# Patient Record
Sex: FEMALE | Race: WHITE | NOT HISPANIC OR LATINO | Employment: FULL TIME | URBAN - METROPOLITAN AREA
[De-identification: names, ages, dates, MRNs, and addresses within clinical notes are randomized per-mention and may not be internally consistent; named-entity substitution may affect disease eponyms.]

---

## 2017-01-03 ENCOUNTER — GENERIC CONVERSION - ENCOUNTER (OUTPATIENT)
Dept: OTHER | Facility: OTHER | Age: 52
End: 2017-01-03

## 2017-01-25 ENCOUNTER — GENERIC CONVERSION - ENCOUNTER (OUTPATIENT)
Dept: OTHER | Facility: OTHER | Age: 52
End: 2017-01-25

## 2017-05-03 ENCOUNTER — ALLSCRIPTS OFFICE VISIT (OUTPATIENT)
Dept: OTHER | Facility: OTHER | Age: 52
End: 2017-05-03

## 2017-07-07 RX ORDER — NIFEDIPINE 30 MG/1
30 TABLET, FILM COATED, EXTENDED RELEASE ORAL EVERY MORNING
COMMUNITY
End: 2018-02-01 | Stop reason: SDUPTHER

## 2017-07-07 RX ORDER — DIPHENOXYLATE HYDROCHLORIDE AND ATROPINE SULFATE 2.5; .025 MG/1; MG/1
1 TABLET ORAL EVERY MORNING
COMMUNITY

## 2017-07-07 RX ORDER — VITAMIN E 268 MG
400 CAPSULE ORAL EVERY MORNING
COMMUNITY

## 2017-07-10 ENCOUNTER — GENERIC CONVERSION - ENCOUNTER (OUTPATIENT)
Dept: OTHER | Facility: OTHER | Age: 52
End: 2017-07-10

## 2017-07-10 ENCOUNTER — HOSPITAL ENCOUNTER (OUTPATIENT)
Facility: AMBULARY SURGERY CENTER | Age: 52
Setting detail: OUTPATIENT SURGERY
Discharge: HOME | End: 2017-07-10
Attending: INTERNAL MEDICINE | Admitting: INTERNAL MEDICINE
Payer: COMMERCIAL

## 2017-07-10 ENCOUNTER — ANESTHESIA (OUTPATIENT)
Dept: GASTROENTEROLOGY | Facility: AMBULARY SURGERY CENTER | Age: 52
End: 2017-07-10
Payer: COMMERCIAL

## 2017-07-10 VITALS
OXYGEN SATURATION: 100 % | SYSTOLIC BLOOD PRESSURE: 115 MMHG | DIASTOLIC BLOOD PRESSURE: 66 MMHG | HEART RATE: 77 BPM | BODY MASS INDEX: 25.76 KG/M2 | WEIGHT: 140 LBS | HEIGHT: 62 IN | RESPIRATION RATE: 18 BRPM | TEMPERATURE: 97.7 F

## 2017-07-10 RX ORDER — SODIUM CHLORIDE, SODIUM LACTATE, POTASSIUM CHLORIDE, CALCIUM CHLORIDE 600; 310; 30; 20 MG/100ML; MG/100ML; MG/100ML; MG/100ML
INJECTION, SOLUTION INTRAVENOUS CONTINUOUS PRN
Status: DISCONTINUED | OUTPATIENT
Start: 2017-07-10 | End: 2017-07-10 | Stop reason: SURG

## 2017-07-10 RX ORDER — PROPOFOL 10 MG/ML
INJECTION, EMULSION INTRAVENOUS AS NEEDED
Status: DISCONTINUED | OUTPATIENT
Start: 2017-07-10 | End: 2017-07-10 | Stop reason: SURG

## 2017-07-10 RX ADMIN — PROPOFOL 20 MG: 10 INJECTION, EMULSION INTRAVENOUS at 11:43

## 2017-07-10 RX ADMIN — PROPOFOL 100 MG: 10 INJECTION, EMULSION INTRAVENOUS at 11:30

## 2017-07-10 RX ADMIN — PROPOFOL 20 MG: 10 INJECTION, EMULSION INTRAVENOUS at 11:37

## 2017-07-10 RX ADMIN — PROPOFOL 20 MG: 10 INJECTION, EMULSION INTRAVENOUS at 11:45

## 2017-07-10 RX ADMIN — PROPOFOL 20 MG: 10 INJECTION, EMULSION INTRAVENOUS at 11:39

## 2017-07-10 RX ADMIN — PROPOFOL 50 MG: 10 INJECTION, EMULSION INTRAVENOUS at 11:35

## 2017-07-10 RX ADMIN — LIDOCAINE HYDROCHLORIDE 40 MG: 20 INJECTION, SOLUTION INTRAVENOUS at 11:30

## 2017-07-10 RX ADMIN — PROPOFOL 50 MG: 10 INJECTION, EMULSION INTRAVENOUS at 11:31

## 2017-07-10 RX ADMIN — PROPOFOL 20 MG: 10 INJECTION, EMULSION INTRAVENOUS at 11:41

## 2017-07-10 RX ADMIN — SODIUM CHLORIDE, SODIUM LACTATE, POTASSIUM CHLORIDE, AND CALCIUM CHLORIDE: .6; .31; .03; .02 INJECTION, SOLUTION INTRAVENOUS at 11:10

## 2017-12-13 ENCOUNTER — ALLSCRIPTS OFFICE VISIT (OUTPATIENT)
Dept: OTHER | Facility: OTHER | Age: 52
End: 2017-12-13

## 2017-12-13 ENCOUNTER — GENERIC CONVERSION - ENCOUNTER (OUTPATIENT)
Dept: OTHER | Facility: OTHER | Age: 52
End: 2017-12-13

## 2017-12-13 DIAGNOSIS — Z12.31 ENCOUNTER FOR SCREENING MAMMOGRAM FOR MALIGNANT NEOPLASM OF BREAST: ICD-10-CM

## 2017-12-13 LAB — INTERPRETATION (HISTORICAL): NEGATIVE

## 2017-12-19 ENCOUNTER — ALLSCRIPTS OFFICE VISIT (OUTPATIENT)
Dept: OTHER | Facility: OTHER | Age: 52
End: 2017-12-19

## 2017-12-20 ENCOUNTER — GENERIC CONVERSION - ENCOUNTER (OUTPATIENT)
Dept: OTHER | Facility: OTHER | Age: 52
End: 2017-12-20

## 2017-12-20 LAB
A/G RATIO (HISTORICAL): 2 (ref 1.2–2.2)
ALBUMIN SERPL BCP-MCNC: 4.4 G/DL (ref 3.5–5.5)
ALP SERPL-CCNC: 57 IU/L (ref 39–117)
AST SERPL W P-5'-P-CCNC: 24 IU/L (ref 0–40)
BILIRUB SERPL-MCNC: 0.3 MG/DL (ref 0–1.2)
BUN SERPL-MCNC: 22 MG/DL (ref 6–24)
BUN/CREA RATIO (HISTORICAL): 27 (ref 9–23)
CALCIUM SERPL-MCNC: 9.1 MG/DL (ref 8.7–10.2)
CHLORIDE SERPL-SCNC: 103 MMOL/L (ref 96–106)
CHOLEST SERPL-MCNC: 205 MG/DL (ref 100–199)
CO2 SERPL-SCNC: 27 MMOL/L (ref 18–29)
CREAT SERPL-MCNC: 0.83 MG/DL (ref 0.57–1)
CREATININE, URINE (HISTORICAL): 83.2 MG/DL
EGFR AFRICAN AMERICAN (HISTORICAL): 94 ML/MIN/1.73
EGFR-AMERICAN CALC (HISTORICAL): 81 ML/MIN/1.73
GLUCOSE SERPL-MCNC: 86 MG/DL (ref 65–99)
HDLC SERPL-MCNC: 85 MG/DL
LDLC SERPL CALC-MCNC: 112 MG/DL (ref 0–99)
MICROALBUM.,U,RANDOM (HISTORICAL): 24.1 UG/ML
MICROALBUMIN/CREATININE RATIO (HISTORICAL): 29 MG/G CREAT (ref 0–30)
POTASSIUM SERPL-SCNC: 4.6 MMOL/L (ref 3.5–5.2)
SODIUM SERPL-SCNC: 144 MMOL/L (ref 134–144)
TOT. GLOBULIN, SERUM (HISTORICAL): 2.2 G/DL (ref 1.5–4.5)
TOTAL PROTEIN (HISTORICAL): 6.6 G/DL (ref 6–8.5)
TRIGL SERPL-MCNC: 39 MG/DL (ref 0–149)
TSH SERPL DL<=0.05 MIU/L-ACNC: 1.3 UIU/ML (ref 0.45–4.5)
VLDLC SERPL CALC-MCNC: 8 MG/DL (ref 5–40)

## 2017-12-21 ENCOUNTER — GENERIC CONVERSION - ENCOUNTER (OUTPATIENT)
Dept: FAMILY MEDICINE CLINIC | Facility: CLINIC | Age: 52
End: 2017-12-21

## 2017-12-21 LAB
HEPATITIS C ANTIBODY (HISTORICAL): <0.1 S/CO RATIO (ref 0–0.9)
INTERPRETATION (HISTORICAL): NORMAL

## 2017-12-21 NOTE — PROGRESS NOTES
Assessment   Assessed    1  Primary familial hypertrophic cardiomyopathy (425 4) (I42 2)   2  Body mass index (BMI) of 25 0 to 25 9 in adult (V85 21) (G80 15)   3  Spina bifida of lumbar spine (741 93) (Q05 7)    Plan   Abnormal ECG    · EKG/ECG- POC; Status:Complete;   Done: 17AHC2829   Perform: In Office; Due:44Kpe4436; Last Updated By:Zurdo Quarles Reason; 12/19/2017 4:02:03 PM;Ordered; For:Abnormal ECG; Ordered By:Tim Lucero;    Discussion/Summary   Cardiology Discussion Summary Free Text Note Form [facility]: 1  Primary family history of hypertrophic cardiomyopathy- normal voltages on resting 12-lead EKG   Unfortunately her brother with cardiac arrest at early age  Therefore no genetic subtyping was completed  We reviewed her echocardiogram which shows no evidence of hypertrophic cardiomyopathy or any variance  Annual EKG with pcp agree with 2-3 year checks for her children   controlled   Health maintenance- LDL- at goal   2 years  Counseling Documentation With Imm: total time of encounter was 30 minute discussion of her echocardiogram results and plan for future screenings  Also discussed family screening minutes  Chief Complaint   Chief Complaint Free Text Note Form: PT is here for F/U, no cardiac complaints at this time  History of Present Illness   Cardiology HPI Free Text Note Form [facility]: Appreciate consultation by Dr Ioana Barker woman first-degree family member history of hypertrophic cardiomyopathy, hypertension, spina bifida presents for initial consultation  She reports her brother passing away at the age of 25 due to an arrhythmic event associated with exercise  She has been periodically monitored for hypertrophic cardiomyopathy with prior to negative echocardiograms multiple years ago  She reports daily exercise without any palpitations, syncope, chest pain  She has some mild dependent edema  She denies any PND or orthopnea  She has been compliant with antihypertensive medications   She has had well-controlled blood pressures  15 she denies having any palpitations or chest pain  She has been compliant with her antihypertensive medications  She denies having any lower semi-edema  We reviewed through her echocardiogram and all her questions were answered  She has been anxious about the diagnosis of hypertrophic cardiomyopathy ever since her brother's event:   No dizziness  No shorntess of breath with exertion  No edema  Reviewed ekg and prior workup with patient  Review of Systems   Cardiology Female ROS:         Cardiac: as noted in HPI  Skin: No complaints of nonhealing sores or skin rash  Genitourinary: loss of bladder control      Psychological: No complaints of feeling depressed, anxiety, panic attacks, or difficulty concentrating  General: No complaints of trouble sleeping, lack of energy, fatigue, appetite changes, weight changes, fever, frequent infections, or night sweats  Respiratory: No complaints of shortness of breath, cough with sputum, or wheezing  HEENT: No complaints of serious problems, hearing problems, nose problems, throat problems, or snoring  Gastrointestinal: No complaints of liver problems, nausea, vomiting, heartburn, constipation, bloody stools, diarrhea, problems swallowing, adbominal pain, or rectal bleeding  Hematologic: No complaints of bleeding disorders, anemia, blood clots, or excessive brusing  Neurological: weakness      Active Problems   Problems    1  Abnormal ECG (794 31) (R94 31)   2  Benign essential hypertension (401 1) (I10)   3  Body mass index (BMI) of 25 0 to 25 9 in adult (V85 21) (Z68 25)   4  Constipation (564 00) (K59 00)   5  Contact dermatitis due to plant (692 6) (L25 5)   6  Encounter for hepatitis C virus screening test for high risk patient (V73 89)     (Z11 59,Z91 89)   7   Encounter for Papanicolaou cervical smear to confirm findings of recent normal smear     following initial abnormal smear (V72 32) (Z01 42)   8  Encounter for screening mammogram for malignant neoplasm of breast (V76 12)     (Z12 31)   9  Health care maintenance (V70 0) (Z00 00)   10  Lyme disease (088 81) (A69 20)   11  Need for influenza vaccination (V04 81) (Z23)   12  Need for prophylactic vaccination and inoculation against influenza (V04 81) (Z23)   13  Primary familial hypertrophic cardiomyopathy (425 4) (I42 2)   14  Raynaud's syndrome without gangrene (443 0) (I73 00)   15  Screening for colon cancer (V76 51) (Z12 11)   16  Spina bifida of lumbar spine (741 93) (Q05 7)    Past Medical History   Problems    1  History of Acute lower UTI (599 0) (N39 0)   2  History of Acute upper respiratory infection (465 9) (J06 9)   3  Benign essential hypertension (401 1) (I10)   4  History of Disorder of arteries and arterioles (447 9) (I77 9)   5  History of Dysuria (788 1) (R30 0)   6  History of acute bronchitis (V12 69) (Z87 09)   7  History of menorrhagia (V13 29) (Z87 42)   8  History of Malodorous urine (791 9) (R82 90)   9  History of Need for influenza vaccination (V04 81) (Z23)   10  Need for prophylactic vaccination and inoculation against influenza (V04 81) (Z23)   11  History of Puncture wound, hand (882 0) (R02 987K)  Active Problems And Past Medical History Reviewed: The active problems and past medical history were reviewed and updated today  Surgical History   Problems    1  History of  Section  Surgical History Reviewed: The surgical history was reviewed and updated today  Family History   Mother    1  Denied: Family history of Colon cancer   2  Denied: Family history of colonic polyps   3  Family history of Feeling Fine  Father    4  Denied: Family history of Colon cancer   5  Denied: Family history of colonic polyps   6  Family history of malignant neoplasm (V16 9) (Z80 9)   7  Family history of Liver Cancer  Sister    8  Family history of Breast Cancer (V16 3)  Brother    5   Family history of Cardiomyopathy   10  Family history of sudden cardiac death (SCD) (V17 41) (Z82 41)   11  Family history of Idiopathic Hypertrophic Subaortic Stenosis  Maternal Grandmother    15  Family history of malignant neoplasm (V16 9) (Z80 9)  Family History Reviewed: The family history was reviewed and updated today  Social History   Problems    · Denied: History of Alcohol Use (History)   · Daily Coffee Consumption (2  Cups/Day)   · Exercise: Walking   · Former smoker (N57 02) (Z32 593)   ·    · No alcohol use   · Pets/Animals: Cat   · Pets/Animals: Dog   · Sleeps 8 - 10 hours a day  Social History Reviewed: The social history was reviewed and updated today  Current Meds    1  Biotin CAPS; take 1 capsule daily; Therapy: (Recorded:03May2017) to Recorded   2  Fish Oil OIL; USE AS DIRECTED; Therapy: (Recorded:03May2017) to Recorded   3  Multivitamin TABS; TAKE 1 TABLET DAILY; Therapy: (Recorded:03May2017) to Recorded   4  NIFEdipine ER 30 MG Oral Tablet Extended Release 24 Hour; Take 1 tablet daily; Therapy: 22QGK7126 to (Evaluate:87Gms9333)  Requested for: 58ZZO8718; Last     :09WEX7740 Ordered   5  Sertraline HCl - 50 MG Oral Tablet; Take 1 tablet daily; Therapy: 37TIX3818 to (Last Rx:21Oct2016) Ordered   6  Vitamin B Complex CAPS; take 1 capsule daily; Therapy: (Recorded:03May2017) to Recorded   7  Vitamin D CAPS; take 1 capsule daily; Therapy: (Recorded:03May2017) to Recorded  Medication List Reviewed: The medication list was reviewed and updated today  Allergies   Medication    1  No Known Drug Allergies  Non-Medication    2  No Known Environmental Allergies   3   No Known Food Allergies    Vitals   Vital Signs    Recorded: 25UTR2366 04:00PM   Heart Rate 58, Apical   Systolic 748, LUE, Sitting   Diastolic 78, LUE, Sitting   Height 5 ft 2 in   Weight 142 lb 8 oz   BMI Calculated 26 06   BSA Calculated 1 66   O2 Saturation 98, RA     Physical Exam Constitutional      General appearance: No acute distress, well appearing and well nourished  Eyes      Conjunctiva and Sclera examination: Conjunctiva pink, sclera anicteric  Ears, Nose, Mouth, and Throat - Oropharynx: Clear, nares are clear, mucous membranes are moist       Neck      Neck and thyroid: Normal, supple, trachea midline, no thyromegaly  Pulmonary      Respiratory effort: No increased work of breathing or signs of respiratory distress  Auscultation of lungs: Clear to auscultation, no rales, no rhonchi, no wheezing, good air movement  Cardiovascular      Palpation of heart: Normal PMI, no thrills  Auscultation of heart: Normal rate and rhythm, normal S1 and S2, no murmurs  -- No murmurs gallops or rubs positive S2 2/6 tricuspid murmur  Carotid pulses: Normal, 2+ bilaterally  Peripheral vascular exam: Normal pulses throughout, no tenderness, erythema or swelling  Pedal pulses: Normal, 2+ bilaterally  Examination of extremities for edema and/or varicosities: Normal        Abdomen      Abdomen: Non-tender and no distention  Liver and spleen: No hepatomegaly or splenomegaly  Musculoskeletal Gait and station: Abnormal -- Digits and nails: Normal without clubbing or cyanosis  -- Inspection/palpation of joints, bones, and muscles: Normal, ROM normal        Skin - Skin and subcutaneous tissue: Normal without rashes or lesions  Skin is warm and well perfused, normal turgor  Neurologic - Cranial nerves: II - XII intact  -- Speech: Normal        Psychiatric - Orientation to person, place, and time: Normal -- Mood and affect: Normal       Results/Data   Diagnostic Studies Reviewed Cardio:      Echocardiogram/GEOVANNA: Normal left ventricular wall thickness  Normal left ventricular ejection fraction  No significant wall motion abnormalities      ECG Report: Normal sinus rhythm possible left atrial enlargement, nonspecific T-wave changes      Health Management   Encounter for screening mammogram for malignant neoplasm of breast   Digital Bilateral Screening Mammogram With CAD; every 1 year; Last 05WAM1970; Next Due:    15NRT6361; Overdue  Need for prophylactic vaccination and inoculation against influenza   Influenza; every 1 year; Last 64NPR6005; Next Due: 65RQH0939; Overdue  Screening for colon cancer   COLONOSCOPY (GI, SURG); every 5 years; Last 93GUI5121; Next Due: 60Bau8434;  Active    Future Appointments      Date/Time Provider Specialty Site   06/27/2018 04:30 PM Luis Saxena[address]     Signatures    Electronically signed by : Ely Hamman, M D ; Dec 20 2017  9:22PM EST                       (Author)

## 2017-12-22 ENCOUNTER — GENERIC CONVERSION - ENCOUNTER (OUTPATIENT)
Dept: OTHER | Facility: OTHER | Age: 52
End: 2017-12-22

## 2018-01-10 NOTE — RESULT NOTES
Verified Results  (Q) COMPREHENSIVE METABOLIC PNL W/ADJUSTED CALCIUM 16RMC8801 06:00AM Corbin Middleton     Test Name Result Flag Reference   GLUCOSE 85 mg/dL  65-99   Fasting reference interval   UREA NITROGEN (BUN) 21 mg/dL  7-25   CREATININE 0 86 mg/dL  0 50-1 05   For patients >52years of age, the reference limit  for Creatinine is approximately 13% higher for people  identified as -American  eGFR NON-AFR  AMERICAN 78 mL/min/1 73m2  > OR = 60   eGFR AFRICAN AMERICAN 91 mL/min/1 73m2  > OR = 60   BUN/CREATININE RATIO   6-71   NOT APPLICABLE (calc)   SODIUM 141 mmol/L  135-146   POTASSIUM 4 7 mmol/L  3 5-5 3   CHLORIDE 106 mmol/L     CARBON DIOXIDE 30 mmol/L  20-31   CALCIUM 9 4 mg/dL  8 6-10 4   CALCIUM (ADJUSTED FOR$ALBUMIN) 9 5 mg/dL (calc)  8 6-10 2   PROTEIN, TOTAL 6 5 g/dL  6 1-8 1   ALBUMIN 4 3 g/dL  3 6-5 1   GLOBULIN 2 2 g/dL (calc)  1 9-3 7   ALBUMIN/GLOBULIN RATIO 2 0 (calc)  1 0-2 5   BILIRUBIN, TOTAL 0 5 mg/dL  0 2-1 2   ALKALINE PHOSPHATASE 58 U/L     AST 21 U/L  10-35   ALT 19 U/L  6-29     (Q) TSH, 3RD GENERATION W/REFLEX TO FT4 72DBF5225 06:00AM Corbin Kane     Test Name Result Flag Reference   TSH W/REFLEX TO FT4 0 94 mIU/L     Reference Range                         > or = 20 Years  0 40-4 50                              Pregnancy Ranges            First trimester    0 26-2 66            Second trimester   0 55-2 73            Third trimester    0 43-2 91  NO COLLECTION DATE RECEIVED  WE HAVE USED  THE DATE THE SPECIMEN WAS RECEIVED BY THIS  LABORATORY AS THE COLLECTION DATE  IF THIS  IS INCORRECT, PLEASE CONTACT CLIENT SERVICES    PHONE NUMBER: [phone]     (Q) LIPID PANEL WITH DIRECT LDL 53OZQ7423 06:00AM Corbin Kane     Test Name Result Flag Reference   CHOLESTEROL, TOTAL 214 mg/dL H 125-200   HDL CHOLESTEROL 73 mg/dL  > OR = 46   TRIGLICERIDES 55 mg/dL  <972   DIRECT  mg/dL  <130   Desirable range <100 mg/dL for patients with CHD or  diabetes and <70 mg/dL for diabetic patients with  known heart disease  CHOL/HDLC RATIO 2 9 (calc)  < OR = 5 0   NON HDL CHOLESTEROL 141 mg/dL (calc)     Target for non-HDL cholesterol is 30 mg/dL higher than   LDL cholesterol target  Plan  Benign essential hypertension    · NIFEdipine ER 30 MG Oral Tablet Extended Release 24 Hour; Take 1 tablet  daily    Discussion/Summary   Charli Ceballos, YOur ldl, bad cholesterol is creeping up  Limit saturated fat and cholesterol in the diet and exercise daily   [address]

## 2018-01-12 NOTE — PROGRESS NOTES
Assessment    1  Encounter for preventive health examination (V70 0) (Z00 00)   2  Body mass index (BMI) of 25 0 to 25 9 in adult (V85 21) (W98 65)    Plan  Benign essential hypertension    · (Q) COMPREHENSIVE METABOLIC PNL W/ADJUSTED CALCIUM; Status:Active; Requested for:14Jth7974;    · (Q) LIPID PANEL WITH DIRECT LDL; Status:Active; Requested for:69Gol2728;    · (Q) TSH, 3RD GENERATION W/REFLEX TO FT4; Status:Active; Requested  for:33Fpg8699;   Benign essential hypertension, Health Maintenance    · (Q) COMPREHENSIVE METABOLIC PANEL W/O eGFR; Status:Active; Requested  for:55Gtn6492;    · (Q) LIPID PANEL WITH DIRECT LDL; Status:Active; Requested for:72Zgc3773;    · (Q) TSH, 3RD GENERATION W/REFLEX TO FT4; Status:Active; Requested  for:98Lus4237;   Need for influenza vaccination    · Fluzone Quadrivalent 0 5 ML Intramuscular Suspension Prefilled Syringe  Screening for colon cancer    · COLONOSCOPY; Status:Active; Requested for:14Uxu6631;    · 1 - Noemí Wong MD, Chelsie Pineda (Gastroenterology) Physician Referral  Consult Only: the  expectation is that the referring provider will communicate back to the patient on  treatment options  Evaluation and Treatment: the expectation is that the referred to  provider will communicate back to the patient on treatment options  Status: Active   Requested for: 82Dym3030  Care Summary provided  : Yes    Discussion/Summary  health maintenance visit healthy adult female Currently, she eats a healthy diet, has an inadequate exercise regimen and increase exercise to most days  cervical cancer screening is managed by gyn Breast cancer screening: the risks and benefits of breast cancer screening were discussed and breast cancer screening is managed by gyn  Colorectal cancer screening: the risks and benefits of colorectal cancer screening were discussed and colonoscopy has been ordered  Screening lab work includes glucose, lipid profile and thyroid function testing   The immunizations are up to date  Advice and education were given regarding nutrition, aerobic exercise, weight bearing exercise, cardiovascular risk reduction, sunscreen use, self skin examination, helmet use and seat belt use  Chief Complaint  patient parenting for her annual physical sl/cma      History of Present Illness  HM, Adult Female: The patient is being seen for a health maintenance evaluation  The last health maintenance visit was >1 year(s) ago  Social History: Household members include spouse  She is   Work status: working full time and occupation: [employer]  The patient is a former cigarette smoker  She reports never drinking alcohol  She has never used illicit drugs  General Health: She has regular dental visits  She denies vision problems  She denies hearing loss  Immunizations status: up to date   flu vac today  Lifestyle:  She consumes a diverse and healthy diet  She does not have any weight concerns  She exercises regularly  She does not use tobacco  She denies alcohol use  She denies drug use  Reproductive health: the patient is postmenopausal    Screening: cancer screening reviewed and updated  metabolic screening reviewed and updated  risk screening reviewed and updated  HPI: pt feeling well  paps and mammo done with gyn all about women in [facility]  Review of Systems    Constitutional: No fever, no chills, feels well, no tiredness, no recent weight gain or weight loss  Eyes: No complaints of eye pain, no red eyes, no eyesight problems, no discharge, no dry eyes, no itching of eyes  ENT: no complaints of earache, no loss of hearing, no nose bleeds, no nasal discharge, no sore throat, no hoarseness  Cardiovascular: No complaints of slow heart rate, no fast heart rate, no chest pain, no palpitations, no leg claudication, no lower extremity edema  Respiratory: No complaints of shortness of breath, no wheezing, no cough, no SOB on exertion, no orthopnea, no PND  Gastrointestinal: No complaints of abdominal pain, no constipation, no nausea or vomiting, no diarrhea, no bloody stools  Genitourinary: No complaints of dysuria, no incontinence, no pelvic pain, no dysmenorrhea, no vaginal discharge or bleeding  Musculoskeletal: No complaints of arthralgias, no myalgias, no joint swelling or stiffness, no limb pain or swelling  Integumentary: No complaints of skin rash or lesions, no itching, no skin wounds, no breast pain or lump  Neurological: No complaints of headache, no confusion, no convulsions, no numbness, no dizziness or fainting, no tingling, no limb weakness, no difficulty walking  Psychiatric: Not suicidal, no sleep disturbance, no anxiety or depression, no change in personality, no emotional problems  Endocrine: No complaints of proptosis, no hot flashes, no muscle weakness, no deepening of the voice, no feelings of weakness  Hematologic/Lymphatic: No complaints of swollen glands, no swollen glands in the neck, does not bleed easily, does not bruise easily  Active Problems    1  Abnormal ECG (794 31) (R94 31)   2  Benign essential hypertension (401 1) (I10)   3  Contact dermatitis due to plant (692 6) (L25 5)   4  Encounter for screening mammogram for malignant neoplasm of breast (V76 12)   (Z12 31)   5  Health care maintenance (V70 0) (Z00 00)   6  Lyme disease (088 81) (A69 20)   7  Need for prophylactic vaccination and inoculation against influenza (V04 81) (Z23)   8  Primary familial hypertrophic cardiomyopathy (425 4) (I42 2)   9  Raynaud's syndrome without gangrene (443 0) (I73 00)   10   Spina bifida of lumbar spine (741 93) (Q05 7)    Past Medical History    · History of Acute lower UTI (599 0) (N39 0)   · History of Acute upper respiratory infection (465 9) (J06 9)   · History of Disorder of arteries and arterioles (447 9) (I77 9)   · History of Dysuria (788 1) (R30 0)   · History of acute bronchitis (V12 69) (Z87 09)   · History of menorrhagia (V13 29) (Z87 42)   · History of Malodorous urine (791 9) (R82 90)   · Need for prophylactic vaccination and inoculation against influenza (V04 81) (Z23)   · History of Puncture wound, hand (882 0) (Z71 365F)    Family History  Mother    · Family history of Feeling Fine  Father    · Family history of Liver Cancer  Sister    · Family history of Breast Cancer (V16 3)  Brother    · Family history of Cardiomyopathy   · Family history of Idiopathic Hypertrophic Subaortic Stenosis    Social History    · Denied: History of Alcohol Use (History)   · Daily Coffee Consumption (2  Cups/Day)   · Former smoker (V15 82) (R50 463)    Current Meds   1  NIFEdipine ER 30 MG Oral Tablet Extended Release 24 Hour; Take 1 tablet daily; Therapy: 16HBK5131 to (Evaluate:50Pwi9032)  Requested for: 18XHL0497; Last   Rx:78Dkr6948 Ordered   2  Sertraline HCl - 50 MG Oral Tablet; Take 1 tablet daily; Therapy: 71VHC7011 to (Last Rx:21Oct2016) Ordered    Allergies    1  No Known Drug Allergies    Vitals   Recorded: 21Dec2016 09:02AM   Temperature 98 7 F   Heart Rate 77   Respiration 16   Systolic 597   Diastolic 70   Height 5 ft 2 in   Weight 139 lb    BMI Calculated 25 42   BSA Calculated 1 64     Physical Exam    Constitutional   General appearance: No acute distress, well appearing and well nourished  Head and Face   Head and face: Normal     Palpation of the face and sinuses: No sinus tenderness  Eyes   Conjunctiva and lids: No swelling, erythema or discharge  Pupils and irises: Equal, round, reactive to light  Ears, Nose, Mouth, and Throat   External inspection of ears and nose: Normal     Otoscopic examination: Tympanic membranes translucent with normal light reflex  Canals patent without erythema  Hearing: Normal     Nasal mucosa, septum, and turbinates: Normal without edema or erythema  Lips, teeth, and gums: Normal, good dentition  Oropharynx: Normal with no erythema, edema, exudate or lesions      Neck Neck: Supple, symmetric, trachea midline, no masses  Thyroid: Normal, no thyromegaly  Pulmonary   Respiratory effort: No increased work of breathing or signs of respiratory distress  Auscultation of lungs: Clear to auscultation  Cardiovascular   Auscultation of heart: Normal rate and rhythm, normal S1 and S2, no murmurs  Peripheral vascular exam: Normal     Examination of extremities for edema and/or varicosities: Normal     Chest deferred  Abdomen   Abdomen: Non-tender, no masses  Liver and spleen: No hepatomegaly or splenomegaly  deferred  Genitourinary deferred  Lymphatic   Palpation of lymph nodes in neck: No lymphadenopathy  Palpation of lymph nodes in axillae: No lymphadenopathy  Palpation of lymph nodes in groin: No lymphadenopathy  Musculoskeletal   Gait and station: Normal     Digits and nails: Normal without clubbing or cyanosis  Joints, bones, and muscles: Normal     Range of motion: Normal     Stability: Normal     Muscle strength/tone: Normal     Skin   Skin and subcutaneous tissue: Normal without rashes or lesions  Neurologic   Cranial nerves: Cranial nerves II-XII intact  Cortical function: Normal mental status  Reflexes: 2+ and symmetric  Sensation: No sensory loss  Coordination: Normal finger to nose and heel to shin  Psychiatric   Orientation to person, place, and time: Normal     Mood and affect: Normal        Results/Data  PHQ-2 Adult Depression Screening 87Lky7108 09:07AM User, s     Test Name Result Flag Reference   PHQ-2 Adult Depression Score 0     Over the last two weeks, how often have you been bothered by any of the following problems? Little interest or pleasure in doing things: Not at all - 0  Feeling down, depressed, or hopeless: Not at all - 0   PHQ-2 Adult Depression Screening Negative         Procedure    Procedure: Visual Acuity Test    Indication: routine screening  Inforrmation supplied by a Snellen chart     Results: 20/20 in both eyes without corrective device, 20/20 in the right eye without corrective device, 20/20 in the left eye without corrective device normal in both eyes  Color vision was screened with the [place] Test and the results were normal    The patient was cooperative  Health Management  Encounter for screening mammogram for malignant neoplasm of breast   Digital Bilateral Screening Mammogram With CAD; every 1 year; Last 33WFF8110; Next  Due: 52FGN7124; Overdue  Need for prophylactic vaccination and inoculation against influenza   Influenza; every 1 year; Last 37IZM4938; Next Due: 19TLT9664;  Overdue    Signatures   Electronically signed by : Aravind Head; Dec 21 2016  1:03PM EST                       (Author)    Electronically signed by : ARLEEN Durán ; Dec 21 2016  1:14PM EST                       (Author)

## 2018-01-13 NOTE — MISCELLANEOUS
Message  GI Reminder Recall [facility]:   Date: 01/25/2017   Dear Kate Rai:     Review of our records shows you are due for the following: New consult  Please call the following office to schedule your appointment:   [address] [phone]  We look forward to hearing from you!      Sincerely,     [facility]      Signatures   Electronically signed by : Reece Dougherty MA; Jan 25 2017 12:02PM EST                       (Author)

## 2018-01-22 VITALS
DIASTOLIC BLOOD PRESSURE: 80 MMHG | HEIGHT: 62 IN | BODY MASS INDEX: 26.31 KG/M2 | RESPIRATION RATE: 12 BRPM | SYSTOLIC BLOOD PRESSURE: 120 MMHG | TEMPERATURE: 98 F | HEART RATE: 78 BPM | WEIGHT: 143 LBS

## 2018-01-23 VITALS
HEART RATE: 58 BPM | WEIGHT: 142.5 LBS | HEIGHT: 62 IN | SYSTOLIC BLOOD PRESSURE: 118 MMHG | OXYGEN SATURATION: 98 % | DIASTOLIC BLOOD PRESSURE: 78 MMHG | BODY MASS INDEX: 26.22 KG/M2

## 2018-01-23 NOTE — RESULT NOTES
Verified Results  * MAMMO SCREENING BILATERAL W CAD [id number] 12:00AM Tam Iraheta     Test Name Result Flag Reference   MAMMO SCREENING BILATERAL W CAD BENIGN        Summary / No summary entered :      No summary entered   Documents attached :      Millie Napier; Enc: [id number] - Chart Update - -      Dena Duvall) (Result Document)

## 2018-01-23 NOTE — PROGRESS NOTES
Assessment   1  Encounter for preventive health examination (V70 0) (Z00 00)    Plan  Benign essential hypertension    · (1) MICROALBUMIN CREATININE RATIO, RANDOM URINE; Status:Active; Requested  for:25Omy0470;    · (1923 [facility]) Comp  Metabolic Panel (13); Status:Active; Requested for:17Mqt3856;    · (1923 [facility]) Lipid Panel; Status:Active; Requested for:71Cri0836;    · ([facility]) TSH Rfx on Abnormal to Free T4; Status:Active; Requested for:40Wsi9983;   Encounter for hepatitis C virus screening test for high risk patient, Health Maintenance    · (1) HEP C ANTIBODY; Status:Active; Requested for:37Rpz4391;   Encounter for screening mammogram for malignant neoplasm of breast    · MAMMO SCREENING BILATERAL W 3D & CAD; Status:Hold For -  Scheduling,Retrospective Authorization; Requested for:03Zhg4529;     Discussion/Summary  health maintenance visit Currently, she eats a healthy diet and has an adequate exercise regimen  the risks and benefits of cervical cancer screening were discussed cervical cancer screening is current Breast cancer screening: the risks and benefits of breast cancer screening were discussed, monthly self breast exam was advised and mammogram has been ordered  Colorectal cancer screening: the risks and benefits of colorectal cancer screening were discussed and colorectal cancer screening is current  Osteoporosis screening: the risks and benefits of osteoporosis screening were discussed  Screening lab work includes glucose, lipid profile and thyroid function testing  The immunizations are up to date  Advice and education were given regarding nutrition, weight bearing exercise, calcium supplements, vitamin D supplements, sunscreen use, self skin examination, helmet use and seat belt use  Hepatitis C Screening: the patient was counseled on Hepatitis C screening  The patient agrees to Hepatitis C screening  Pt will call when needing rx refills     The patient was counseled regarding instructions for management, risk factor reductions, importance of compliance with treatment  Chief Complaint  Patient here for PE      History of Present Illness  HM, Adult Female: The patient is being seen for a health maintenance evaluation  The last health maintenance visit was >1 year(s) ago  Social History: Household members include spouse  She is   Work status: working full time and occupation: supervisor at [employer]  The patient is a former cigarette smoker  She reports occasional alcohol use  She has never used illicit drugs  General Health: The patient's health since the last visit is described as good  She has regular dental visits  She denies vision problems  She denies hearing loss  Immunizations status: up to date  Lifestyle:  She consumes a diverse and healthy diet  She does not have any weight concerns  She exercises regularly  She does not use tobacco  She consumes alcohol  She denies drug use  Reproductive health: the patient is postmenopausal   she uses no contraception  she is sexually active  pregnancy history:     Screening: cancer screening reviewed and current  metabolic screening reviewed and updated  risk screening reviewed and updated  HPI: PE, feeling well today  Review of Systems    Constitutional: No fever, no chills, feels well, no tiredness, no recent weight gain or weight loss  Eyes: No complaints of eye pain, no red eyes, no eyesight problems, no discharge, no dry eyes, no itching of eyes  ENT: no complaints of earache, no loss of hearing, no nose bleeds, no nasal discharge, no sore throat, no hoarseness  Cardiovascular: No complaints of slow heart rate, no fast heart rate, no chest pain, no palpitations, no leg claudication, no lower extremity edema  Respiratory: No complaints of shortness of breath, no wheezing, no cough, no SOB on exertion, no orthopnea, no PND     Gastrointestinal: No complaints of abdominal pain, no constipation, no nausea or vomiting, no diarrhea, no bloody stools  Genitourinary: No complaints of dysuria, no incontinence, no pelvic pain, no dysmenorrhea, no vaginal discharge or bleeding  Musculoskeletal: No complaints of arthralgias, no myalgias, no joint swelling or stiffness, no limb pain or swelling  Integumentary: No complaints of skin rash or lesions, no itching, no skin wounds, no breast pain or lump  Neurological: No complaints of headache, no confusion, no convulsions, no numbness, no dizziness or fainting, no tingling, no limb weakness, no difficulty walking  Psychiatric: Not suicidal, no sleep disturbance, no anxiety or depression, no change in personality, no emotional problems  Endocrine: No complaints of proptosis, no hot flashes, no muscle weakness, no deepening of the voice, no feelings of weakness  Hematologic/Lymphatic: No complaints of swollen glands, no swollen glands in the neck, does not bleed easily, does not bruise easily  Active Problems   1  Abnormal ECG (794 31) (R94 31)  2  Benign essential hypertension (401 1) (I10)  3  Body mass index (BMI) of 25 0 to 25 9 in adult (V85 21) (Z68 25)  4  Constipation (564 00) (K59 00)  5  Contact dermatitis due to plant (692 6) (L25 5)  6  Encounter for screening mammogram for malignant neoplasm of breast (V76 12)   (Z12 31)  7  Health care maintenance (V70 0) (Z00 00)  8  Lyme disease (088 81) (A69 20)  9  Need for influenza vaccination (V04 81) (Z23)  10  Need for prophylactic vaccination and inoculation against influenza (V04 81) (Z23)  11  Primary familial hypertrophic cardiomyopathy (425 4) (I42 2)  12  Raynaud's syndrome without gangrene (443 0) (I73 00)  13  Screening for colon cancer (V76 51) (Z12 11)  14   Spina bifida of lumbar spine (741 93) (Q05 7)    Past Medical History    · History of Acute lower UTI (599 0) (N39 0)   · History of Acute upper respiratory infection (465 9) (J06 9)   · Benign essential hypertension (401 1) (I10)   · History of Disorder of arteries and arterioles (447 9) (I77 9)   · History of Dysuria (788 1) (R30 0)   · History of acute bronchitis (V12 69) (Z87 09)   · History of menorrhagia (V13 29) (Z87 42)   · History of Malodorous urine (791 9) (R82 90)   · History of Need for influenza vaccination (V04 81) (Z23)   · Need for prophylactic vaccination and inoculation against influenza (V04 81) (Z23)   · History of Puncture wound, hand (882 0) (Q01 789U)    Surgical History    · History of  Section    Family History  Mother    · Denied: Family history of Colon cancer   · Denied: Family history of colonic polyps   · Family history of Feeling Fine  Father    · Denied: Family history of Colon cancer   · Denied: Family history of colonic polyps   · Family history of malignant neoplasm (V16 9) (Z80 9)   · Family history of Liver Cancer  Sister    · Family history of Breast Cancer (V16 3)  Brother    · Family history of Cardiomyopathy   · Family history of sudden cardiac death (SCD) (V17 41) (Z82 41)   · Family history of Idiopathic Hypertrophic Subaortic Stenosis  Maternal Grandmother    · Family history of malignant neoplasm (V16 9) (Z80 9)    Social History    · Denied: History of Alcohol Use (History)   · Daily Coffee Consumption (2  Cups/Day)   · Exercise: Walking   · Former smoker (V15 82) (O50 104)   ·    · No alcohol use   · Pets/Animals: Cat   · Pets/Animals: Dog   · Sleeps 8 - 10 hours a day    Current Meds  1  Biotin CAPS; take 1 capsule daily; Therapy: (Recorded:2017) to Recorded  2  Fish Oil OIL; USE AS DIRECTED; Therapy: (Recorded:2017) to Recorded  3  Multivitamin TABS; TAKE 1 TABLET DAILY; Therapy: (Recorded:2017) to Recorded  4  NIFEdipine ER 30 MG Oral Tablet Extended Release 24 Hour; Take 1 tablet daily; Therapy: 10FQD3013 to (Evaluate:41Ltg3679)  Requested for: 37ZKC2414; Last   ZL:90NBV9409 Ordered  5  Sertraline HCl - 50 MG Oral Tablet; Take 1 tablet daily;    Therapy: 24LDZ5145 to (Last Rx: 77Hwu5720) Ordered  6  Vitamin B Complex CAPS; take 1 capsule daily; Therapy: (Recorded:65Nnb7669) to Recorded  7  Vitamin D CAPS; take 1 capsule daily; Therapy: (Recorded:17Acd8590) to Recorded    Allergies   1  No Known Drug Allergies   2  No Known Environmental Allergies  3  No Known Food Allergies    Vitals   Recorded: 33Iih7736 09:08AM   Temperature 98 F   Heart Rate 78   Respiration 12   Systolic 229   Diastolic 80   Height 5 ft 2 in   Weight 143 lb    BMI Calculated 26 16   BSA Calculated 1 66     Physical Exam    Constitutional   General appearance: No acute distress, well appearing and well nourished  Head and Face   Head and face: Normal     Palpation of the face and sinuses: No sinus tenderness  Eyes   Conjunctiva and lids: No swelling, erythema or discharge  Pupils and irises: Equal, round, reactive to light  Ears, Nose, Mouth, and Throat   External inspection of ears and nose: Normal     Otoscopic examination: Tympanic membranes translucent with normal light reflex  Canals patent without erythema  Hearing: Normal     Nasal mucosa, septum, and turbinates: Normal without edema or erythema  Lips, teeth, and gums: Normal, good dentition  Oropharynx: Normal with no erythema, edema, exudate or lesions  Neck   Neck: Supple, symmetric, trachea midline, no masses  Thyroid: Normal, no thyromegaly  Pulmonary   Respiratory effort: No increased work of breathing or signs of respiratory distress  Auscultation of lungs: Clear to auscultation  Cardiovascular   Auscultation of heart: Normal rate and rhythm, normal S1 and S2, no murmurs  Peripheral vascular exam: Normal     Examination of extremities for edema and/or varicosities: Normal     Chest deferred  Abdomen   Abdomen: Non-tender, no masses  Liver and spleen: No hepatomegaly or splenomegaly  Genitourinary deferred  Lymphatic   Palpation of lymph nodes in neck: No lymphadenopathy      Palpation of lymph nodes in axillae: No lymphadenopathy  Palpation of lymph nodes in groin: No lymphadenopathy  Musculoskeletal   Gait and station: Normal     Skin   Skin and subcutaneous tissue: Normal without rashes or lesions  Neurologic   Cranial nerves: Cranial nerves II-XII intact  Cortical function: Normal mental status  Reflexes: 2+ and symmetric  Psychiatric   Orientation to person, place, and time: Normal     Mood and affect: Normal        Procedure    Procedure: Visual Acuity Test    Inforrmation supplied by a Snellen chart  Results: 20/25 in both eyes with corrective device, 20/25 in the right eye with corrective device, 20/25 in the left eye with corrective device   Color vision was screened with the Ishihara Test and the results were normal       Health Management  Encounter for screening mammogram for malignant neoplasm of breast   Digital Bilateral Screening Mammogram With CAD; every 1 year; Last 51JQC4066; Next  Due: 98LBL4947; Overdue  Need for prophylactic vaccination and inoculation against influenza   Influenza; every 1 year; Last 01CEF7531; Next Due: 43ECK3895; Overdue  Screening for colon cancer   COLONOSCOPY (GI, SURG); every 5 years; Last 40BVG7331; Next Due: 98Bam7095; Active    Attending Note  Collaborating Physician Note: Collaborating Physician: I agree with the Advanced Practitioner note  Future Appointments    Date/Time Provider Specialty Site   12/19/2017 03:40 PM ARLEEN Nation   Cardiology [address]     Signatures   Electronically signed by : Kay Gleason; Jan 25 2018  3:00PM EST                       (Author)    Electronically signed by : Aydin Saul DO; Jan 31 2018 11:06AM EST                       (Author)

## 2018-02-01 DIAGNOSIS — I10 HYPERTENSION, UNSPECIFIED TYPE: Primary | ICD-10-CM

## 2018-02-05 DIAGNOSIS — I10 HYPERTENSION, UNSPECIFIED TYPE: Primary | ICD-10-CM

## 2018-02-05 RX ORDER — NIFEDIPINE 30 MG/1
30 TABLET, FILM COATED, EXTENDED RELEASE ORAL EVERY MORNING
Qty: 90 TABLET | Refills: 1 | Status: SHIPPED | OUTPATIENT
Start: 2018-02-05 | End: 2018-02-05 | Stop reason: SDUPTHER

## 2018-02-05 RX ORDER — NIFEDIPINE 30 MG/1
30 TABLET, FILM COATED, EXTENDED RELEASE ORAL EVERY MORNING
Qty: 90 TABLET | Refills: 0 | Status: SHIPPED | OUTPATIENT
Start: 2018-02-05 | End: 2018-03-19 | Stop reason: SDUPTHER

## 2018-02-12 ENCOUNTER — TELEPHONE (OUTPATIENT)
Dept: FAMILY MEDICINE CLINIC | Facility: CLINIC | Age: 53
End: 2018-02-12

## 2018-02-14 DIAGNOSIS — I10 ESSENTIAL HYPERTENSION, MALIGNANT: Primary | ICD-10-CM

## 2018-02-14 RX ORDER — NIFEDIPINE 30 MG/1
30 TABLET, FILM COATED, EXTENDED RELEASE ORAL DAILY
Qty: 90 TABLET | Refills: 0 | Status: SHIPPED | OUTPATIENT
Start: 2018-02-14 | End: 2018-03-17 | Stop reason: SDUPTHER

## 2018-02-16 DIAGNOSIS — F32.A DEPRESSION, UNSPECIFIED DEPRESSION TYPE: Primary | ICD-10-CM

## 2018-02-16 DIAGNOSIS — F31.76 DEPRESSED BIPOLAR I DISORDER IN FULL REMISSION (HCC): ICD-10-CM

## 2018-02-19 DIAGNOSIS — F32.A DEPRESSION, UNSPECIFIED DEPRESSION TYPE: Primary | ICD-10-CM

## 2018-02-20 ENCOUNTER — TELEPHONE (OUTPATIENT)
Dept: FAMILY MEDICINE CLINIC | Facility: CLINIC | Age: 53
End: 2018-02-20

## 2018-02-28 NOTE — RESULT NOTES
Verified Results  (1923 [facility]) Comp  Metabolic Panel (13) [id number] 10:48AM [facility]     Test Name Result Flag Reference   Glucose, Serum 86 mg/dL  65-99   BUN 22 mg/dL  6-24   Creatinine, Serum 0 83 mg/dL  0 57-1 00   BUN/Creatinine Ratio 27 H 9-23   Sodium, Serum 144 mmol/L  134-144   Potassium, Serum 4 6 mmol/L  3 5-5 2   Chloride, Serum 103 mmol/L     Carbon Dioxide, Total 27 mmol/L  18-29   Calcium, Serum 9 1 mg/dL  8 7-10 2   Protein, Total, Serum 6 6 g/dL  6 0-8 5   Albumin, Serum 4 4 g/dL  3 5-5 5   Globulin, Total 2 2 g/dL  1 5-4 5   A/G Ratio 2 0  1 2-2 2   Bilirubin, Total 0 3 mg/dL  0 0-1 2   Alkaline Phosphatase, S 57 IU/L     AST (SGOT) 24 IU/L  0-40   eGFR If NonAfricn Am 81 mL/min/1 73  >59   eGFR If Africn Am 94 mL/min/1 73  >59     ([facility]) Lipid Panel 34Usk4922 10:48AM [facility]     Test Name Result Flag Reference   Cholesterol, Total 205 mg/dL H 100-199   Triglycerides 39 mg/dL  0-149   HDL Cholesterol 85 mg/dL  >39   VLDL Cholesterol Shane 8 mg/dL  5-40   LDL Cholesterol Calc 112 mg/dL H 0-99     ([facility]) TSH Rfx on Abnormal to Free T4 [id number] 10:48AM [facility]     Test Name Result Flag Reference   TSH 1 300 uIU/mL  0 450-4 500     (1) MICROALBUMIN CREATININE RATIO, RANDOM URINE 67Oze1252 10:48AM [facility]     Test Name Result Flag Reference   Creatinine, Urine 83 2 mg/dL  Not Estab  Microalbumin, Urine 24 1 ug/mL  Not Estab  Microalb/Creat Ratio 29 0 mg/g creat  0 0-30 0     ([facility]) HCV Antibody [id number] 10:48AM [facility]     Test Name Result Flag Reference   Hep C Virus Ab <0 1 s/co ratio  0 0-0 9   Negative:     < 0 8                                              Indeterminate: 0 8 - 0 9                                                   Positive:     > 0 9                  The CDC recommends that a positive HCV antibody result                  be followed up with a HCV Nucleic Acid Amplification                  test ([id number])       [facility]) Cardiovascular Risk Assessment 87OPG6522 10:48AM Marielena Atkins     Test Name Result Flag Reference   Interpretation Note     -------------------------------  CARDIOVASCULAR REPORT:  -------------------------------  Current available clinical information suggests the  patient's risk is at least LOW  If the patient has two or  more major risk factors, the risk category is intermediate  If the patient has CHD or a CHD risk equivalent, the risk  category is high  If patient does not have CHD or a CHD risk  equivalent, consider use of the Pooled Cohort Equations to  estimate 10-year CVD risk, as individuals with greater than  7 5% risk may warrant more intensive therapy  The calculator  can be found at:  http://tools  cardiosource org/PILGE-Nczb-Xyruxefwi/  -  Insulin resistance, obesity, excessive alcohol use, smoking,  liver disease, and certain medications can cause secondary  dyslipidemia  Consider evaluation if clinically indicated  -  Therapeutic lifestyle changes are always valuable to achieve  optimal blood lipid status (diet, exercise, weight  management)  -------------------------------  LIPID MANAGEMENT  Select one patient risk category based upon medical history  and clinical judgment  Additional risk factors such as  personal or family history of premature CHD, smoking, and  hypertension modify a patient's goals of therapy  In CVD  prevention, the intensity of therapy should be adjusted to  the level of patient risk  MODERATE intensity statin therapy  generally results in an average LDL-C reduction of 30% to  less than 50% from the untreated baseline  Examples include  (daily doses): atorvastatin 10-20 mg, rosuvastatin 5-10 mg,  simvastatin 20-40 mg, pravastatin 40-80 mg, lovastatin 40  mg  HIGH intensity statin therapy generally results in an  average LDL-C reduction of 50% or more from the untreated  baseline   Examples include (daily doses): atorvastatin 40-80  mg and rosuvastatin 20 mg   -------------------------------  LOW RISK ASSESSMENT AND TREATMENT SUGGESTIONS  -------------------------------  LDL-C is acceptable, was 128 and now is 112 mg/dL  Non-HDL  Cholesterol is optimal, was 146 and now is 120 mg/dL  -  Considerations for use of statin therapy include family  history of premature atherosclerotic disease, elevated  coronary artery calcium score, ankle-brachial index < 0 9,  elevated CRP, or elevated 10-year or lifetime CVD risk   -------------------------------  INTERMEDIATE RISK ASSESSMENT AND TREATMENT SUGGESTIONS  -------------------------------  LDL-C is acceptable, was 128 and now is 112 mg/dL  Non-HDL  Cholesterol is optimal, was 146 and now is 120 mg/dL  -  Consider measurement of LDL particle number or Apo B to  adjudicate need for further LDL lowering therapy  Consider  beginning or increasing statin  Factors that may influence  statin use include family history of premature  atherosclerotic disease, elevated coronary artery calcium  score, ankle-brachial index < 0 9, elevated CRP, or elevated  10-year or lifetime CVD risk  If statin cannot be tolerated  or increased, alternatives include use of an intestinal  agent (ezetimibe or bile acid sequestrant) or niacin   -------------------------------  HIGH RISK ASSESSMENT AND TREATMENT SUGGESTIONS  -------------------------------  LDL-C is borderline high, was 128 and now is 112 mg/dL  Non-HDL Cholesterol is normal, was 146 and now is 120 mg/dL  -  Begin statin  If statin already in use, consider increasing  dose to achieve at least a 50% LDL reduction from baseline  Moderate or high intensity statin is preferred   If statin  cannot be tolerated or increased, alternatives include use  of an intestinal agent (ezetimibe or bile acid sequestrant)  or niacin   -------------------------------  DISCLAIMER  These assessments and treatment suggestions are provided as  a convenience in support of the physician-patient  relationship and are not intended to replace the physician's  clinical judgment  They are derived from national guidelines  in addition to other evidence and expert opinion  The  clinician should consider this information within the  context of clinical opinion and the individual patient  SEE GUIDANCE FOR CARDIOVASCULAR REPORT: Sofia Gonzalez et al  2013  ACC/AHA guideline on the treatment of blood cholesterol to  reduce atherosclerotic cardiovascular risk in adults: a  report of the [employer] of Cardiology/American Heart  Association Task Force on Practice Guidelines  Circulation  2014; 129 (suppl 2): S1?S45; Pierre et al  Clin Chem 2009;  55(3):407-419; Abdoulaye et al  Diabetes Care 2008;  31(4):811-82  PDF Image            Plan  Encounter for screening mammogram for malignant neoplasm of breast    · MAMMO SCREENING BILATERAL W 3D & CAD; Status:Hold For - Scheduling; Requested  for:73Zwz0927;

## 2018-03-17 DIAGNOSIS — I10 ESSENTIAL HYPERTENSION, MALIGNANT: ICD-10-CM

## 2018-03-19 RX ORDER — NIFEDIPINE 30 MG/1
TABLET, FILM COATED, EXTENDED RELEASE ORAL
Qty: 90 TABLET | Refills: 1 | Status: SHIPPED | OUTPATIENT
Start: 2018-03-19 | End: 2018-06-27

## 2018-06-18 ENCOUNTER — OFFICE VISIT (OUTPATIENT)
Dept: FAMILY MEDICINE CLINIC | Facility: CLINIC | Age: 53
End: 2018-06-18
Payer: COMMERCIAL

## 2018-06-18 VITALS
SYSTOLIC BLOOD PRESSURE: 110 MMHG | HEART RATE: 78 BPM | WEIGHT: 145 LBS | RESPIRATION RATE: 12 BRPM | BODY MASS INDEX: 26.52 KG/M2 | TEMPERATURE: 97.5 F | DIASTOLIC BLOOD PRESSURE: 78 MMHG

## 2018-06-18 DIAGNOSIS — R31.9 URINARY TRACT INFECTION WITH HEMATURIA, SITE UNSPECIFIED: Primary | ICD-10-CM

## 2018-06-18 DIAGNOSIS — N39.0 URINARY TRACT INFECTION WITHOUT HEMATURIA, SITE UNSPECIFIED: Primary | ICD-10-CM

## 2018-06-18 DIAGNOSIS — R35.0 FREQUENT URINATION: ICD-10-CM

## 2018-06-18 DIAGNOSIS — R31.9 HEMATURIA, UNSPECIFIED TYPE: ICD-10-CM

## 2018-06-18 DIAGNOSIS — N39.0 URINARY TRACT INFECTION WITH HEMATURIA, SITE UNSPECIFIED: Primary | ICD-10-CM

## 2018-06-18 LAB
SL AMB  POCT GLUCOSE, UA: ABNORMAL
SL AMB LEUKOCYTE ESTERASE,UA: 75
SL AMB POCT BILIRUBIN,UA: ABNORMAL
SL AMB POCT BLOOD,UA: 50
SL AMB POCT CLARITY,UA: ABNORMAL
SL AMB POCT COLOR,UA: YELLOW
SL AMB POCT KETONES,UA: ABNORMAL
SL AMB POCT NITRITE,UA: ABNORMAL
SL AMB POCT PH,UA: 7
SL AMB POCT SPECIFIC GRAVITY,UA: 1.02
SL AMB POCT URINE PROTEIN: ABNORMAL
SL AMB POCT UROBILINOGEN: 1

## 2018-06-18 PROCEDURE — 81003 URINALYSIS AUTO W/O SCOPE: CPT | Performed by: NURSE PRACTITIONER

## 2018-06-18 PROCEDURE — 99214 OFFICE O/P EST MOD 30 MIN: CPT | Performed by: NURSE PRACTITIONER

## 2018-06-18 RX ORDER — CIPROFLOXACIN 500 MG/1
500 TABLET, FILM COATED ORAL EVERY 12 HOURS SCHEDULED
Qty: 10 TABLET | Refills: 0 | Status: SHIPPED | OUTPATIENT
Start: 2018-06-18 | End: 2018-06-23

## 2018-06-18 RX ORDER — NICOTINE POLACRILEX 2 MG
1 GUM BUCCAL DAILY
COMMUNITY

## 2018-06-18 RX ORDER — CHLORAL HYDRATE 500 MG
CAPSULE ORAL
COMMUNITY

## 2018-06-18 NOTE — PROGRESS NOTES
Assessment/Plan:  1  Urinary tract infection with hematuria, site unspecified  Push fluids  Tylenol or motrin prn    - ciprofloxacin (CIPRO) 500 mg tablet; Take 1 tablet (500 mg total) by mouth every 12 (twelve) hours for 5 days  Dispense: 10 tablet; Refill: 0  Will check culture  - POCT urine dip auto non-scope  - Urine culture  2  Hematuria, unspecified type  - Urine culture  - POCT urine dip auto non-scope    Subjective:      Patient ID: Delta Pham is a 48 y o  female who presents for uti    History of recurrent uti  Symptoms started one week ago  Has tethered spinal cord so usually does not have pain with urination  Noticed a foul smell in urine  No fever  No n/v  No abdominal pain  Tried to increase fluids  The following portions of the patient's history were reviewed and updated as appropriate: allergies, current medications, past family history, past medical history, past social history, past surgical history and problem list     Review of Systems   Constitutional: Negative for chills and fever  Gastrointestinal: Negative for abdominal distention, abdominal pain, diarrhea, nausea and vomiting  Genitourinary: Negative for difficulty urinating, dysuria, frequency, hematuria (thought urine looked a little dark and may be blood  ) and urgency  Musculoskeletal: Negative for back pain  Skin: Negative for rash  Hematological: Negative for adenopathy  Objective:    POCT UA: (+) nitrite, (+) blood    /78 (BP Location: Left arm, Patient Position: Sitting, Cuff Size: Standard)   Pulse 78   Temp 97 5 °F (36 4 °C) (Temporal)   Resp 12   Wt 65 8 kg (145 lb)   BMI 26 52 kg/m²          Physical Exam   Constitutional: She is oriented to person, place, and time  She appears well-developed and well-nourished  No distress  Abdominal: Soft  Bowel sounds are normal  She exhibits no distension  There is no tenderness  No cva tenderness  No suprapubic pain with palpation  Lymphadenopathy:     She has no cervical adenopathy  Neurological: She is alert and oriented to person, place, and time  Skin: Skin is warm and dry  No rash noted  No erythema  Psychiatric: She has a normal mood and affect  Her behavior is normal  Judgment and thought content normal    Vitals reviewed

## 2018-06-18 NOTE — PATIENT INSTRUCTIONS
Cipro 500mg twice daily for 5 days  Increase fluid intake  Tylenol or motrin as needed for discomfort  Will check urine culture  Urinary Tract Infection in Women   AMBULATORY CARE:   A urinary tract infection (UTI)  is caused by bacteria that get inside your urinary tract  Most bacteria that enter your urinary tract come out when you urinate  If the bacteria stay in your urinary tract, you may get an infection  Your urinary tract includes your kidneys, ureters, bladder, and urethra  Urine is made in your kidneys, and it flows from the ureters to the bladder  Urine leaves the bladder through the urethra  A UTI is more common in your lower urinary tract, which includes your bladder and urethra  Common symptoms include the following:   · Urinating more often or waking from sleep to urinate    · Pain or burning when you urinate    · Pain or pressure in your lower abdomen     · Urine that smells bad    · Blood in your urine    · Leaking urine  Seek care immediately if:   · You are urinating very little or not at all  · You have a high fever with shaking chills  · You have side or back pain that gets worse  Contact your healthcare provider if:   · You have a fever  · You do not feel better after 2 days of taking antibiotics  · You are vomiting  · You have questions or concerns about your condition or care  Treatment for a UTI  may include medicines to treat a bacterial infection  You may also need medicines to decrease pain and burning, or decrease the urge to urinate often  Prevent a UTI:   · Empty your bladder often  Urinate and empty your bladder as soon as you feel the need  Do not hold your urine for long periods of time  · Wipe from front to back after you urinate or have a bowel movement  This will help prevent germs from getting into your urinary tract through your urethra  · Drink liquids as directed    Ask how much liquid to drink each day and which liquids are best for you  You may need to drink more liquids than usual to help flush out the bacteria  Do not drink alcohol, caffeine, or citrus juices  These can irritate your bladder and increase your symptoms  Your healthcare provider may recommend cranberry juice to help prevent a UTI  · Urinate after you have sex  This can help flush out bacteria passed during sex  · Do not douche or use feminine deodorants  These can change the chemical balance in your vagina  · Change sanitary pads or tampons often  This will help prevent germs from getting into your urinary tract  · Do pelvic muscle exercises often  Pelvic muscle exercises may help you start and stop urinating  Strong pelvic muscles may help you empty your bladder easier  Squeeze these muscles tightly for 5 seconds like you are trying to hold back urine  Then relax for 5 seconds  Gradually work up to squeezing for 10 seconds  Do 3 sets of 15 repetitions a day, or as directed  Follow up with your healthcare provider as directed:  Write down your questions so you remember to ask them during your visits  © 2017 [facility] Information is for End User's use only and may not be sold, redistributed or otherwise used for commercial purposes  All illustrations and images included in CareNotes® are the copyrighted property of A D A M , Inc  or Dioni Cardenas  The above information is an  only  It is not intended as medical advice for individual conditions or treatments  Talk to your doctor, nurse or pharmacist before following any medical regimen to see if it is safe and effective for you

## 2018-06-20 ENCOUNTER — TELEPHONE (OUTPATIENT)
Dept: FAMILY MEDICINE CLINIC | Facility: CLINIC | Age: 53
End: 2018-06-20

## 2018-06-20 LAB
BACTERIA UR CULT: NORMAL
Lab: NORMAL

## 2018-06-20 NOTE — TELEPHONE ENCOUNTER
Gretel Summers    Patient is scheduled 6/27 for bp/med ck with you  She saw Brandee Yulisa this past Monday for uti and bp was normal  Does she still need to see you next week, as she has high deductible  Please advise

## 2018-06-26 PROBLEM — K59.00 CONSTIPATION: Status: ACTIVE | Noted: 2017-05-03

## 2018-06-27 ENCOUNTER — OFFICE VISIT (OUTPATIENT)
Dept: FAMILY MEDICINE CLINIC | Facility: CLINIC | Age: 53
End: 2018-06-27
Payer: COMMERCIAL

## 2018-06-27 VITALS
TEMPERATURE: 98.3 F | WEIGHT: 145 LBS | SYSTOLIC BLOOD PRESSURE: 120 MMHG | HEIGHT: 62 IN | BODY MASS INDEX: 26.68 KG/M2 | HEART RATE: 80 BPM | DIASTOLIC BLOOD PRESSURE: 77 MMHG | RESPIRATION RATE: 16 BRPM

## 2018-06-27 DIAGNOSIS — I10 BENIGN ESSENTIAL HYPERTENSION: Primary | ICD-10-CM

## 2018-06-27 DIAGNOSIS — I73.00 RAYNAUD'S SYNDROME WITHOUT GANGRENE: ICD-10-CM

## 2018-06-27 DIAGNOSIS — I10 ESSENTIAL HYPERTENSION, MALIGNANT: ICD-10-CM

## 2018-06-27 DIAGNOSIS — I42.2 PRIMARY FAMILIAL HYPERTROPHIC CARDIOMYOPATHY (HCC): ICD-10-CM

## 2018-06-27 PROCEDURE — 3074F SYST BP LT 130 MM HG: CPT | Performed by: NURSE PRACTITIONER

## 2018-06-27 PROCEDURE — 3078F DIAST BP <80 MM HG: CPT | Performed by: NURSE PRACTITIONER

## 2018-06-27 PROCEDURE — 99214 OFFICE O/P EST MOD 30 MIN: CPT | Performed by: NURSE PRACTITIONER

## 2018-06-27 PROCEDURE — 3008F BODY MASS INDEX DOCD: CPT | Performed by: NURSE PRACTITIONER

## 2018-06-27 RX ORDER — NIFEDIPINE 30 MG/1
30 TABLET, FILM COATED, EXTENDED RELEASE ORAL DAILY
Qty: 90 TABLET | Refills: 3 | Status: SHIPPED | OUTPATIENT
Start: 2018-06-27 | End: 2018-11-20 | Stop reason: SDUPTHER

## 2018-06-27 NOTE — PATIENT INSTRUCTIONS
You due for blood work now that is an 8 hour fast that is lab slip 1  Lab slip 2  Was due the end of December  That one is a 12 hour fast   Check her blood pressure from time to time at home if it is running high see me otherwise see me for a checkup the end of December early January after your blood work is done  Get her flu shot fall  If you would like to breakthrough your weight loss plateau continue your low glycemic diet and try introducing intermittent fasting  There is a book written by Negar Rodriguez MD that will map this out for you

## 2018-06-27 NOTE — PROGRESS NOTES
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)       Past Surgical History:   Procedure Laterality Date     SECTION      x3; 1998, [address], 2005    COLONOSCOPY N/A 7/10/2017    Procedure: COLONOSCOPY;  Surgeon: Nayely Whitt MD;  Location: [place] [address] GI LAB;   Service: Gastroenterology    WISDOM TOOTH EXTRACTION         Patient Active Problem List   Diagnosis    Abnormal ECG    Benign essential hypertension    Primary familial hypertrophic cardiomyopathy ([facility] [address] )    Raynaud's syndrome without gangrene    Spina bifida of lumbar spine ([facility] [address] )    Constipation       Family History   Problem Relation Age of Onset    No Known Problems Mother     Cancer Father         liver    Heart disease Brother         hypotropic cardiomyopathy    Cardiomyopathy Brother     Sudden death Brother         cardiac (SCD)    Other Brother         idiopathic hypertrophic subarotic stenosis    Breast cancer Sister     Cancer Maternal Grandmother        Immunization History   Administered Date(s) Administered    Influenza Quadrivalent Preservative Free 3 years and older IM 2016    Influenza TIV (IM) 2010, 2012, 2013, 2014, 2015, 10/13/2017    TD (adult) Preservative Free 2014    Tdap 2009       No Known Allergies    Current Outpatient Prescriptions   Medication Sig Dispense Refill    b complex vitamins tablet Take 1 tablet by mouth every morning      Biotin 1 MG CAPS Take 1 capsule by mouth daily      BLACK COHOSH PO Take by mouth every morning      Calcium Citrate-Vitamin D (CALCIUM + D PO) Take by mouth every morning      Lactobacillus (ACIDOPHILUS PO) Take by mouth every morning      multivitamin (THERAGRAN) TABS Take 1 tablet by mouth every morning      NIFEdipine ER (ADALAT CC) 30 MG 24 hr tablet Take 1 tablet (30 mg total) by mouth daily 90 tablet 3    Omega-3 Fatty Acids (FISH OIL) 1,000 mg by Does not apply route      sertraline (ZOLOFT) 50 mg tablet Take 50 mg by mouth daily      vitamin E, tocopherol, 400 units capsule Take 400 Units by mouth every morning      sertraline (ZOLOFT) 50 mg tablet Take 1 tablet (50 mg total) by mouth every morning for 90 days 90 tablet 1     No current facility-administered medications for this visit  Social History     Social History    Marital status: /Civil Union     Spouse name: N/A    Number of children: N/A    Years of education: N/A     Social History Main Topics    Smoking status: Never Smoker    Smokeless tobacco: Never Used      Comment: former smoker (As per allscripts)    Alcohol use Yes      Comment: seldom; denied: history of alcohol use (as per allscripts);  no alcohol use (as per allscripts)    Drug use: No    Sexual activity: Not Asked     Other Topics Concern    None     Social History Narrative    Daily coffee consumption (2 cups/day)    Exercise: walking    Pets/animals: Cat    Pets/animals: Dog    Sleeps 8-10 hours a day       Review of Systems   Constitutional: Negative  HENT: Negative  Eyes: Negative  Respiratory: Negative  Cardiovascular: Negative  Gastrointestinal: Negative  Endocrine: Negative  Genitourinary: Negative  Musculoskeletal: Negative  Skin: Negative  Allergic/Immunologic: Negative  Neurological: Negative  Hematological: Negative  Psychiatric/Behavioral: Negative            Objective:    /77 (BP Location: Left arm, Patient Position: Sitting, Cuff Size: Standard)   Pulse 80   Temp 98 3 °F (36 8 °C)   Resp 16   Ht 5' 2" (1 575 m)   Wt 65 8 kg (145 lb)   BMI 26 52 kg/m²        Physical Exam   Constitutional: She is oriented to person, place, and time  She appears well-developed and well-nourished  No distress  HENT:   Head: Normocephalic  Right Ear: External ear normal    Left Ear: External ear normal    Eyes: Conjunctivae are normal  No scleral icterus  Neck: No JVD present  Cardiovascular: Normal rate, regular rhythm and normal heart sounds  Pulmonary/Chest: Effort normal and breath sounds normal    Musculoskeletal: She exhibits no edema  Neurological: She is alert and oriented to person, place, and time  Skin: Skin is warm and dry  Psychiatric: She has a normal mood and affect  Assessment/Plan:    No problem-specific Assessment & Plan notes found for this encounter  Diagnoses and all orders for this visit:    Benign essential hypertension  Comments:  BP at goal no medication changes today  Comprehensive metabolic is due now next lab cycle in December  Orders:  -     Comprehensive metabolic panel; Future  -     Comprehensive metabolic panel; Future  -     TSH, 3rd generation with T4 reflex; Future  -     Lipid panel; Future  -     Microalbumin / creatinine urine ratio    Raynaud's syndrome without gangrene  Comments:  Patient continues to have good capture of her Raynaud's with her calcium channel blocker therapy  Primary familial hypertrophic cardiomyopathy ([facility] [address] )  Comments:  Patient does not have cardiomyopathy this is a familial disorder and was present in her brother  She is monitored yearly with her cardiologist Chuck Valles  Essential hypertension, malignant  Comments:  BP at goal no med changes  Orders:  -     NIFEdipine ER (ADALAT CC) 30 MG 24 hr tablet; Take 1 tablet (30 mg total) by mouth daily         Lengthy visit with records review, problem focused education with lengthy Q&A to pt stated satisfaction    30 minutes spent with >50% time spent in supportive counseling/education  Patient Instructions   You due for blood work now that is an 8 hour fast that is lab slip 1  Lab slip 2  Was due the end of December  That one is a 12 hour fast   Check her blood pressure from time to time at home if it is running high see me otherwise see me for a checkup the end of December early January after your blood work is done  Get her flu shot fall  If you would like to breakthrough your weight loss plateau continue your low glycemic diet and try introducing intermittent fasting  There is a book written by Kevin Mcgill MD that will map this out for you                      Florecita Perez

## 2018-09-28 DIAGNOSIS — F32.A DEPRESSION, UNSPECIFIED DEPRESSION TYPE: ICD-10-CM

## 2018-10-22 ENCOUNTER — OFFICE VISIT (OUTPATIENT)
Dept: FAMILY MEDICINE CLINIC | Facility: CLINIC | Age: 53
End: 2018-10-22
Payer: COMMERCIAL

## 2018-10-22 VITALS
TEMPERATURE: 98.6 F | BODY MASS INDEX: 27.68 KG/M2 | HEIGHT: 62 IN | WEIGHT: 150.4 LBS | HEART RATE: 80 BPM | SYSTOLIC BLOOD PRESSURE: 120 MMHG | DIASTOLIC BLOOD PRESSURE: 78 MMHG

## 2018-10-22 DIAGNOSIS — N39.0 URINARY TRACT INFECTION WITHOUT HEMATURIA, SITE UNSPECIFIED: Primary | ICD-10-CM

## 2018-10-22 LAB
SL AMB  POCT GLUCOSE, UA: ABNORMAL
SL AMB LEUKOCYTE ESTERASE,UA: 500
SL AMB POCT BILIRUBIN,UA: ABNORMAL
SL AMB POCT BLOOD,UA: 50
SL AMB POCT CLARITY,UA: ABNORMAL
SL AMB POCT COLOR,UA: YELLOW
SL AMB POCT KETONES,UA: ABNORMAL
SL AMB POCT NITRITE,UA: ABNORMAL
SL AMB POCT PH,UA: 6.5
SL AMB POCT SPECIFIC GRAVITY,UA: 1.01
SL AMB POCT URINE PROTEIN: ABNORMAL
SL AMB POCT UROBILINOGEN: ABNORMAL

## 2018-10-22 PROCEDURE — 81003 URINALYSIS AUTO W/O SCOPE: CPT | Performed by: FAMILY MEDICINE

## 2018-10-22 PROCEDURE — 99213 OFFICE O/P EST LOW 20 MIN: CPT | Performed by: FAMILY MEDICINE

## 2018-10-22 RX ORDER — CIPROFLOXACIN 500 MG/1
500 TABLET, FILM COATED ORAL EVERY 12 HOURS SCHEDULED
Qty: 14 TABLET | Refills: 0 | Status: SHIPPED | OUTPATIENT
Start: 2018-10-22 | End: 2018-10-29

## 2018-10-22 NOTE — PROGRESS NOTES
SUBJECTIVE: Juaquin Kenyon is a 48 y o  female who complains of urinary frequency w/ strong odor x 5 days, without flank pain, fever, chills, or abnormal vaginal discharge or bleeding  Patient with a history of neurogenic bladder therefore retain urine  Specialist and PCP aware of this  Patient has had UTI in May which responded well to Cipro  OBJECTIVE: Appears well, in no apparent distress  Vital signs are normal  The abdomen is soft without tenderness, guarding, mass, rebound or organomegaly  No CVA tenderness or inguinal adenopathy noted  Urine dipstick shows positive for RBC's, positive for nitrates and positive for leukocytes  Micro exam: not done  Vitals:    10/22/18 1543   BP: 120/78   Pulse: 80   Temp: 98 6 °F (37 °C)       ASSESSMENT: UTI uncomplicated without evidence of pyelonephritis    PLAN:     1) UTI:  -Encourage the patient to continues supportive care by avoiding bath tubs,increase fluid intake  Prescribed Cipro 500 mg BID for 7 days  Return to the office in 2 weeks if symptoms don't resolve  Please call the office for any questions or concerns

## 2018-10-22 NOTE — PROGRESS NOTES
Arturo Villeda 1965 female MRN: [id number]    [facility] PRACTICE ACUTE OFFICE VISIT  [facility] -  [facility] [address]      ASSESSMENT/PLAN  Arturo Villeda is a 48 y o  female presents to the office for    {Assess/Plan SmartLinks:90271}     Disposition:    Future Appointments  Date Time Provider Rai Murphy   10/22/2018 4:00 PM Noel Kwon MD [facility]   2018 9:30 AM OVIDIO Lewis [facility]          SUBJECTIVE  CC: Possible UTI (odor in urine)      HPI:  Arturo Villeda is a 48 y o  female who presents for an acute appointment  - Smell, unable to sense other  2 years has had at least 4 UTIS    Review of Systems   Constitutional: Negative for activity change, appetite change, chills, fatigue and fever  HENT: Negative for congestion  Eyes: Negative for visual disturbance  Respiratory: Negative for cough, chest tightness and shortness of breath  Cardiovascular: Negative for chest pain and leg swelling  Gastrointestinal: Negative for abdominal distention, abdominal pain, constipation, diarrhea, nausea and vomiting  Allergic/Immunologic: Negative for environmental allergies  Neurological: Negative for dizziness, light-headedness and headaches  All other systems reviewed and are negative  Historical Information   The patient history was reviewed as follows:  Past Medical History:   Diagnosis Date    Benign essential hypertension 10/09/2009    Contact lens/glasses fitting     Disorder of arteries and arterioles ([facility] [address] ) 2009    Hypertension     PONV (postoperative nausea and vomiting)     Tethered spinal cord (HCC)     from the left knee 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)         Past Surgical History:   Procedure Laterality Date     SECTION      x3; , 36,     COLONOSCOPY N/A 7/10/2017    Procedure: COLONOSCOPY;  Surgeon: Piedad Thomas MD;  Location: [place] [address] GI LAB;   Service: Gastroenterology  WISDOM TOOTH EXTRACTION       Family History   Problem Relation Age of Onset    No Known Problems Mother     Cancer Father         liver    Heart disease Brother         hypotropic cardiomyopathy    Cardiomyopathy Brother     Sudden death Brother         cardiac (SCD)    Other Brother         idiopathic hypertrophic subarotic stenosis    Breast cancer Sister     Cancer Maternal Grandmother       Social History   History   Alcohol Use    Yes     Comment: seldom; denied: history of alcohol use (as per allscripts);  no alcohol use (as per allscripts)     History   Drug Use No     History   Smoking Status    Never Smoker   Smokeless Tobacco    Never Used     Comment: former smoker (As per allscripts)       Medications:     Current Outpatient Prescriptions:     b complex vitamins tablet, Take 1 tablet by mouth every morning, Disp: , Rfl:     Biotin 1 MG CAPS, Take 1 capsule by mouth daily, Disp: , Rfl:     BLACK COHOSH PO, Take by mouth every morning, Disp: , Rfl:     Calcium Citrate-Vitamin D (CALCIUM + D PO), Take by mouth every morning, Disp: , Rfl:     Lactobacillus (ACIDOPHILUS PO), Take by mouth every morning, Disp: , Rfl:     multivitamin (THERAGRAN) TABS, Take 1 tablet by mouth every morning, Disp: , Rfl:     NIFEdipine ER (ADALAT CC) 30 MG 24 hr tablet, Take 1 tablet (30 mg total) by mouth daily, Disp: 90 tablet, Rfl: 3    Omega-3 Fatty Acids (FISH OIL) 1,000 mg, by Does not apply route, Disp: , Rfl:     sertraline (ZOLOFT) 50 mg tablet, Take 50 mg by mouth daily, Disp: , Rfl:     vitamin E, tocopherol, 400 units capsule, Take 400 Units by mouth every morning, Disp: , Rfl:     No Known Allergies    OBJECTIVE  Vitals:   Vitals:    10/22/18 1543   BP: 120/78   BP Location: Right arm   Patient Position: Sitting   Cuff Size: Standard   Pulse: 80   Temp: 98 6 °F (37 °C)   Weight: 68 2 kg (150 lb 6 4 oz)   Height: 5' 2" (1 575 m)         Physical Exam               Tolu Fong MD, [facility]  10/22/2018

## 2018-10-25 DIAGNOSIS — N39.0 URINARY TRACT INFECTION WITHOUT HEMATURIA, SITE UNSPECIFIED: Primary | ICD-10-CM

## 2018-10-25 LAB
BACTERIA UR CULT: ABNORMAL
LABCORP COMMENT: NORMAL
Lab: ABNORMAL
SL AMB ANTIMICROBIAL SUSCEPTIBILITY: ABNORMAL

## 2018-10-25 RX ORDER — NITROFURANTOIN 25; 75 MG/1; MG/1
100 CAPSULE ORAL 2 TIMES DAILY
Qty: 10 CAPSULE | Refills: 0 | Status: SHIPPED | OUTPATIENT
Start: 2018-10-25 | End: 2018-11-02

## 2018-10-25 NOTE — PROGRESS NOTES
Please advise the patient that the abx that she has been getting is not going to responded to treatment ( resistent on culture), if she is feeling better she has a high chance of reoccurrence  This is likely why she has been getting them often recently  Would like her to start on Nitrofur e very 6 hrs x 5 days  script called in   Attempted to call myself

## 2018-11-01 ENCOUNTER — TELEPHONE (OUTPATIENT)
Dept: FAMILY MEDICINE CLINIC | Facility: CLINIC | Age: 53
End: 2018-11-01

## 2018-11-01 DIAGNOSIS — Z12.39 SCREENING FOR MALIGNANT NEOPLASM OF BREAST: Primary | ICD-10-CM

## 2018-11-01 DIAGNOSIS — R92.2 DENSE BREAST TISSUE: Primary | ICD-10-CM

## 2018-11-01 DIAGNOSIS — Z12.39 BREAST CANCER SCREENING: ICD-10-CM

## 2018-11-01 NOTE — TELEPHONE ENCOUNTER
Samara Smith,    Patient has an appointment with you on December 28th and she was wondering if you can mail her an order for her mammo now so she can get that done beforehand so she can have it done by the end of the year?  TY

## 2018-11-02 ENCOUNTER — OFFICE VISIT (OUTPATIENT)
Dept: CARDIOLOGY CLINIC | Facility: CLINIC | Age: 53
End: 2018-11-02
Payer: COMMERCIAL

## 2018-11-02 VITALS
SYSTOLIC BLOOD PRESSURE: 118 MMHG | WEIGHT: 150 LBS | OXYGEN SATURATION: 96 % | DIASTOLIC BLOOD PRESSURE: 72 MMHG | BODY MASS INDEX: 27.6 KG/M2 | HEIGHT: 62 IN | HEART RATE: 82 BPM

## 2018-11-02 DIAGNOSIS — R94.31 ABNORMAL EKG: Primary | ICD-10-CM

## 2018-11-02 DIAGNOSIS — I42.2 PRIMARY FAMILIAL HYPERTROPHIC CARDIOMYOPATHY (HCC): ICD-10-CM

## 2018-11-02 PROCEDURE — 99214 OFFICE O/P EST MOD 30 MIN: CPT | Performed by: INTERNAL MEDICINE

## 2018-11-02 PROCEDURE — 93000 ELECTROCARDIOGRAM COMPLETE: CPT | Performed by: INTERNAL MEDICINE

## 2018-11-05 ENCOUNTER — TELEPHONE (OUTPATIENT)
Dept: FAMILY MEDICINE CLINIC | Facility: CLINIC | Age: 53
End: 2018-11-05

## 2018-11-05 NOTE — TELEPHONE ENCOUNTER
Please have her come in today and get retested? She might have devloped a yeast infection?  Ask about discharge

## 2018-11-05 NOTE — TELEPHONE ENCOUNTER
Dr Jenna Strauss    Patient finished meds given for uti end of last week  She still has odor in urine  Please advise what to do

## 2018-11-20 ENCOUNTER — TELEPHONE (OUTPATIENT)
Dept: FAMILY MEDICINE CLINIC | Facility: CLINIC | Age: 53
End: 2018-11-20

## 2018-11-20 DIAGNOSIS — I10 ESSENTIAL HYPERTENSION, MALIGNANT: ICD-10-CM

## 2018-11-20 DIAGNOSIS — N39.0 URINARY TRACT INFECTION WITHOUT HEMATURIA, SITE UNSPECIFIED: Primary | ICD-10-CM

## 2018-11-20 RX ORDER — CIPROFLOXACIN 500 MG/1
500 TABLET, FILM COATED ORAL EVERY 12 HOURS SCHEDULED
Qty: 14 TABLET | Refills: 0 | Status: SHIPPED | OUTPATIENT
Start: 2018-11-20 | End: 2018-11-27

## 2018-11-20 RX ORDER — NIFEDIPINE 30 MG/1
TABLET, FILM COATED, EXTENDED RELEASE ORAL
Qty: 90 TABLET | Refills: 1 | Status: SHIPPED | OUTPATIENT
Start: 2018-11-20 | End: 2019-06-24 | Stop reason: SDUPTHER

## 2018-11-20 NOTE — TELEPHONE ENCOUNTER
Called Cipro BID x 7 days ( given recurrent infections), patient will need to schedule an appt first thing tomorrow for a physical exam to be sure this isn't pyelo  Patient has a spinal cord injury  I dont like how she is spiking such high fevers  I know that you normally see her and I am already double booked tomorrow  She will be added to your 8:45   Sorry just very concerned and wanted to start Treatment ASAP, therefore responded to the task; and called the patient

## 2018-11-20 NOTE — TELEPHONE ENCOUNTER
Dr Jamie Cabrera,    Patient said that she came in and saw you for a sinus infection and she doesn't think she was on the antibiotic long enough because her sxs are coming back  She was wondering if you can call her in another round or something else without her coming in since she was recently here for the issue?  Please call if you need her to come back in, TY

## 2018-11-20 NOTE — TELEPHONE ENCOUNTER
I am unsure what Phoebe message means  Patient was seen bymyself for a UTI  Are you able to triage this call and advise me of her symptoms  This patient normally sees ivelisse   But if its her UTI again then will treat her over the phone But never saw her for sinus pain

## 2018-11-20 NOTE — TELEPHONE ENCOUNTER
Pt called back again and said it is a uti  uti came back again and sxs are 102 temp with headache  Pt has strong oder during [employer]   Pt would like for someone to call her cell phone and advise what to do

## 2018-11-21 ENCOUNTER — OFFICE VISIT (OUTPATIENT)
Dept: FAMILY MEDICINE CLINIC | Facility: CLINIC | Age: 53
End: 2018-11-21
Payer: COMMERCIAL

## 2018-11-21 VITALS
HEIGHT: 62 IN | HEART RATE: 120 BPM | SYSTOLIC BLOOD PRESSURE: 122 MMHG | TEMPERATURE: 101.8 F | WEIGHT: 152.2 LBS | RESPIRATION RATE: 16 BRPM | DIASTOLIC BLOOD PRESSURE: 70 MMHG | BODY MASS INDEX: 28.01 KG/M2

## 2018-11-21 DIAGNOSIS — N30.01 ACUTE CYSTITIS WITH HEMATURIA: ICD-10-CM

## 2018-11-21 DIAGNOSIS — R30.0 DYSURIA: Primary | ICD-10-CM

## 2018-11-21 LAB
SL AMB  POCT GLUCOSE, UA: ABNORMAL
SL AMB LEUKOCYTE ESTERASE,UA: 75
SL AMB POCT BILIRUBIN,UA: ABNORMAL
SL AMB POCT BLOOD,UA: 50
SL AMB POCT CLARITY,UA: ABNORMAL
SL AMB POCT COLOR,UA: YELLOW
SL AMB POCT KETONES,UA: ABNORMAL
SL AMB POCT NITRITE,UA: ABNORMAL
SL AMB POCT PH,UA: 5
SL AMB POCT SPECIFIC GRAVITY,UA: 1.01
SL AMB POCT URINE PROTEIN: ABNORMAL
SL AMB POCT UROBILINOGEN: ABNORMAL

## 2018-11-21 PROCEDURE — 99213 OFFICE O/P EST LOW 20 MIN: CPT | Performed by: NURSE PRACTITIONER

## 2018-11-21 PROCEDURE — 81003 URINALYSIS AUTO W/O SCOPE: CPT | Performed by: NURSE PRACTITIONER

## 2018-11-21 RX ORDER — NITROFURANTOIN 25; 75 MG/1; MG/1
100 CAPSULE ORAL 2 TIMES DAILY
Qty: 14 CAPSULE | Refills: 0 | Status: SHIPPED | OUTPATIENT
Start: 2018-11-21 | End: 2018-12-05 | Stop reason: SDUPTHER

## 2018-11-21 NOTE — PROGRESS NOTES
Chief Complaint   Patient presents with    Difficulty Urinating     urine odor    Fever        Patient ID: Gio Murphy is a 48 y o  female  Patient is here today with new complaint onset 10+ days ago noticing an odor to the urine  She states that the problem has continued to worsen since onset  She was seen in early November for urinary tract infection and that culture grew Cipro resistant E coli  Patient reports she feels the same as she did with that prior infection however over time her symptoms have worsened to now include fever and chills  She has treated this at home with Advil  Last dose of Advil with 6+ hours ago  She denies flank pain or hong hematuria  She denies nausea or vomiting  Past Medical History:   Diagnosis Date    Benign essential hypertension 10/09/2009    Contact lens/glasses fitting     Disorder of arteries and arterioles ([facility] [address] ) 2009    Hypertension     PONV (postoperative nausea and vomiting)     Tethered spinal cord (HCC)     from the left knee 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)       Past Surgical History:   Procedure Laterality Date     SECTION      x3; 1998, [address] [facility], [facility]    COLONOSCOPY N/A 7/10/2017    Procedure: COLONOSCOPY;  Surgeon: Soni Peterson MD;  Location: [place] [address] GI LAB;   Service: Gastroenterology    WISDOM TOOTH EXTRACTION         Patient Active Problem List   Diagnosis    Abnormal ECG    Benign essential hypertension    Primary familial hypertrophic cardiomyopathy ([address] )    Raynaud's syndrome without gangrene    Spina bifida of lumbar spine ([address] )    Constipation    Dense breast tissue    Breast cancer screening       Family History   Problem Relation Age of Onset    No Known Problems Mother     Cancer Father         liver    Heart disease Brother         hypotropic cardiomyopathy    Cardiomyopathy Brother     Sudden death Brother         cardiac (SCD)    Other Brother         idiopathic hypertrophic subarotic stenosis    Breast cancer Sister     Cancer Maternal Grandmother        Immunization History   Administered Date(s) Administered    Influenza Quadrivalent Preservative Free 3 years and older IM 12/21/2016    Influenza TIV (IM) 11/29/2010, 12/03/2012, 11/18/2013, 12/04/2014, 12/09/2015, 10/13/2017    TD (adult) Preservative Free 05/14/2014    Tdap 11/20/2009       No Known Allergies    Current Outpatient Prescriptions   Medication Sig Dispense Refill    b complex vitamins tablet Take 1 tablet by mouth every morning      Biotin 1 MG CAPS Take 1 capsule by mouth daily      BLACK COHOSH PO Take by mouth every morning      Calcium Citrate-Vitamin D (CALCIUM + D PO) Take by mouth every morning      ciprofloxacin (CIPRO) 500 mg tablet Take 1 tablet (500 mg total) by mouth every 12 (twelve) hours for 7 days 14 tablet 0    EQL EVENING PRIMROSE OIL PO Take by mouth      Lactobacillus (ACIDOPHILUS PO) Take by mouth every morning      multivitamin (THERAGRAN) TABS Take 1 tablet by mouth every morning      NIFEdipine ER (ADALAT CC) 30 MG 24 hr tablet TAKE 1 TABLET BY MOUTH  DAILY 90 tablet 1    Omega-3 Fatty Acids (FISH OIL) 1,000 mg by Does not apply route      sertraline (ZOLOFT) 50 mg tablet Take 50 mg by mouth daily      vitamin E, tocopherol, 400 units capsule Take 400 Units by mouth every morning      nitrofurantoin (MACROBID) 100 mg capsule Take 1 capsule (100 mg total) by mouth 2 (two) times a day 14 capsule 0     No current facility-administered medications for this visit          Social History     Social History    Marital status: /Civil Union     Spouse name: N/A    Number of children: N/A    Years of education: N/A     Social History Main Topics    Smoking status: Never Smoker    Smokeless tobacco: Never Used      Comment: former smoker (As per allscripts)    Alcohol use Yes      Comment: seldom; denied: history of alcohol use (as per allscripts);  no alcohol use (as per allscripts)  Drug use: No    Sexual activity: Not Asked     Other Topics Concern    None     Social History Narrative    Daily coffee consumption (2 cups/day)    Exercise: walking    Pets/animals: Cat    Pets/animals: Dog    Sleeps 8-10 hours a day       Review of Systems   Constitutional: Positive for fatigue and fever  HENT: Negative  Eyes: Negative  Respiratory: Negative  Cardiovascular: Negative  Genitourinary: Positive for frequency  Negative for difficulty urinating, flank pain and hematuria  Objective:    /70 (BP Location: Left arm, Patient Position: Sitting, Cuff Size: Standard)   Pulse (!) 120   Temp (!) 101 8 °F (38 8 °C)   Resp 16   Ht 5' 2" (1 575 m)   Wt 69 kg (152 lb 3 2 oz)   BMI 27 84 kg/m²        Physical Exam   Constitutional: She appears well-developed and well-nourished  HENT:   Head: Normocephalic  Eyes: Conjunctivae are normal    Cardiovascular: Normal rate and regular rhythm  Abdominal: Soft  There is no tenderness  Office Visit on 11/21/2018   Component Date Value Ref Range Status     COLOR,UA 11/21/2018 yellow   Final    CLARITY,UA 11/21/2018 transparent   Final    SPECIFIC GRAVITY,UA 11/21/2018 1 015   Final     PH,UA 11/21/2018 5   Final    LEUKOCYTE ESTERASE,UA 11/21/2018 75   Final    Anise Saupe 11/21/2018 neg   Final    GLUCOSE, UA 11/21/2018 norm   Final    [place] 11/21/2018 neg   Final    BILIRUBIN,UA 11/21/2018 neg   Final    BLOOD,UA 11/21/2018 50   Final    POCT URINE PROTEIN 11/21/2018 neg   Final    SL AMB POCT UROBILINOGEN 11/21/2018 neg   Final         Assessment/Plan:    No problem-specific Assessment & Plan notes found for this encounter  Diagnoses and all orders for this visit:    Dysuria  -     POCT urine dip auto non-scope    Acute cystitis with hematuria  Comments:  Recurrence with Cipro resistant E coli  Will use Macrobid twice a day for seven days  Add acidification regimen    Cranberry +500 of vit C b i d  Orders:  -     Urine culture; Future  -     nitrofurantoin (MACROBID) 100 mg capsule; Take 1 capsule (100 mg total) by mouth 2 (two) times a day            Patient Instructions   Begin a urinary acidification regimen  Take an over-the-counter cranberry tablet with vitamin-C 500 mg twice daily  If you continue to have recurrence of urinary tract infections he will need to see a urologist for further eval   If you are feeling worse or have a persistent fever go to the emergency room                      Nicole William

## 2018-11-21 NOTE — PATIENT INSTRUCTIONS
Begin a urinary acidification regimen  Take an over-the-counter cranberry tablet with vitamin-C 500 mg twice daily  If you continue to have recurrence of urinary tract infections he will need to see a urologist for further eval   If you are feeling worse or have a persistent fever go to the emergency room

## 2018-11-23 DIAGNOSIS — F32.A DEPRESSION, UNSPECIFIED DEPRESSION TYPE: ICD-10-CM

## 2018-11-24 LAB
BACTERIA UR CULT: ABNORMAL
Lab: ABNORMAL
SL AMB ANTIMICROBIAL SUSCEPTIBILITY: ABNORMAL

## 2018-11-29 ENCOUNTER — OFFICE VISIT (OUTPATIENT)
Dept: FAMILY MEDICINE CLINIC | Facility: CLINIC | Age: 53
End: 2018-11-29
Payer: COMMERCIAL

## 2018-11-29 VITALS
BODY MASS INDEX: 27.38 KG/M2 | TEMPERATURE: 98.6 F | WEIGHT: 145 LBS | HEART RATE: 70 BPM | HEIGHT: 61 IN | DIASTOLIC BLOOD PRESSURE: 78 MMHG | SYSTOLIC BLOOD PRESSURE: 120 MMHG

## 2018-11-29 DIAGNOSIS — R30.0 DYSURIA: Primary | ICD-10-CM

## 2018-11-29 DIAGNOSIS — Z23 NEED FOR INFLUENZA VACCINATION: ICD-10-CM

## 2018-11-29 LAB
SL AMB  POCT GLUCOSE, UA: NORMAL
SL AMB LEUKOCYTE ESTERASE,UA: NORMAL
SL AMB POCT BILIRUBIN,UA: NORMAL
SL AMB POCT BLOOD,UA: NORMAL
SL AMB POCT CLARITY,UA: CLEAR
SL AMB POCT COLOR,UA: YELLOW
SL AMB POCT KETONES,UA: NORMAL
SL AMB POCT NITRITE,UA: NORMAL
SL AMB POCT PH,UA: 8
SL AMB POCT SPECIFIC GRAVITY,UA: 1.01
SL AMB POCT URINE PROTEIN: NORMAL
SL AMB POCT UROBILINOGEN: NORMAL

## 2018-11-29 PROCEDURE — 1036F TOBACCO NON-USER: CPT | Performed by: NURSE PRACTITIONER

## 2018-11-29 PROCEDURE — 81003 URINALYSIS AUTO W/O SCOPE: CPT | Performed by: NURSE PRACTITIONER

## 2018-11-29 PROCEDURE — 90688 IIV4 VACCINE SPLT 0.5 ML IM: CPT | Performed by: NURSE PRACTITIONER

## 2018-11-29 PROCEDURE — 99213 OFFICE O/P EST LOW 20 MIN: CPT | Performed by: NURSE PRACTITIONER

## 2018-11-29 PROCEDURE — 90471 IMMUNIZATION ADMIN: CPT | Performed by: NURSE PRACTITIONER

## 2018-11-29 NOTE — PROGRESS NOTES
Chief Complaint   Patient presents with    Follow-up    Urinary Tract Infection        Patient ID: Rg Bach is a 48 y o  female  Patient is here today for follow-up recurrent urinary tract infection  She states that her symptoms have entirely resolved after seven days of Macrobid treatment  She denies fever chills GI upset or genitourinary symptoms of concern  Her culture was positive for Cipro resistant E coli  She responded well to Macrobid as it was found to be sensitive on culture and sensitivity  Patient has no residual symptoms  Urinary Tract Infection    Pertinent negatives include no flank pain or frequency  Past Medical History:   Diagnosis Date    Benign essential hypertension 10/09/2009    Contact lens/glasses fitting     Disorder of arteries and arterioles ([address] ) 2009    Hypertension     PONV (postoperative nausea and vomiting)     Tethered spinal cord (HCC)     from the left knee 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 [address] [facility]    Constipation    Dense breast tissue    Breast cancer screening       Family History   Problem Relation Age of Onset    No Known Problems Mother     Cancer Father         liver    Heart disease Brother         hypotropic cardiomyopathy    Cardiomyopathy Brother     Sudden death Brother         cardiac (SCD)    Other Brother         idiopathic hypertrophic subarotic stenosis    Breast cancer Sister     Cancer Maternal Grandmother Immunization History   Administered Date(s) Administered    Influenza Quadrivalent Preservative Free 3 years and older IM 12/21/2016    Influenza TIV (IM) 11/29/2010, 12/03/2012, 11/18/2013, 12/04/2014, 12/09/2015, 10/13/2017    TD (adult) Preservative Free 05/14/2014    Tdap 11/20/2009    influenza, injectable, quadrivalent 11/29/2018       No Known Allergies    Current Outpatient Prescriptions   Medication Sig Dispense Refill    b complex vitamins tablet Take 1 tablet by mouth every morning      Biotin 1 MG CAPS Take 1 capsule by mouth daily      BLACK COHOSH PO Take by mouth every morning      Calcium Citrate-Vitamin D (CALCIUM + D PO) Take by mouth every morning      EQL EVENING PRIMROSE OIL PO Take by mouth      Lactobacillus (ACIDOPHILUS PO) Take by mouth every morning      multivitamin (THERAGRAN) TABS Take 1 tablet by mouth every morning      NIFEdipine ER (ADALAT CC) 30 MG 24 hr tablet TAKE 1 TABLET BY MOUTH  DAILY 90 tablet 1    Omega-3 Fatty Acids (FISH OIL) 1,000 mg by Does not apply route      sertraline (ZOLOFT) 50 mg tablet Take 50 mg by mouth daily      vitamin E, tocopherol, 400 units capsule Take 400 Units by mouth every morning      nitrofurantoin (MACROBID) 100 mg capsule Take 1 capsule (100 mg total) by mouth 2 (two) times a day (Patient not taking: Reported on 11/29/2018 ) 14 capsule 0    sertraline (ZOLOFT) 50 mg tablet TAKE 1 TABLET BY MOUTH  EVERY MORNING (Patient not taking: Reported on 11/29/2018) 90 tablet 1     No current facility-administered medications for this visit          Social History     Social History    Marital status: /Civil Union     Spouse name: N/A    Number of children: N/A    Years of education: N/A     Social History Main Topics    Smoking status: Never Smoker    Smokeless tobacco: Never Used      Comment: former smoker (As per allscripts)    Alcohol use Yes      Comment: seldom; denied: history of alcohol use (as per allscripts); no alcohol use (as per allscripts)    Drug use: No    Sexual activity: Not Asked     Other Topics Concern    None     Social History Narrative    Daily coffee consumption (2 cups/day)    Exercise: walking    Pets/animals: Cat    Pets/animals: Dog    Sleeps 8-10 hours a day       Review of Systems   Constitutional: Negative  Respiratory: Negative  Cardiovascular: Negative  Gastrointestinal: Negative  Genitourinary: Negative for dysuria, flank pain and frequency  Objective:    /78 (BP Location: Right arm, Patient Position: Sitting, Cuff Size: Standard)   Pulse 70   Temp 98 6 °F (37 °C) (Temporal)   Ht 5' 1" (1 549 m)   Wt 65 8 kg (145 lb)   BMI 27 40 kg/m²        Physical Exam   Constitutional: She appears well-developed and well-nourished  HENT:   Head: Normocephalic  Abdominal: There is no tenderness  Assessment/Plan:    No problem-specific Assessment & Plan notes found for this encounter  Diagnoses and all orders for this visit:    Dysuria  Comments:  Resolution of symptom  Urinalysis is normal   Follow-up as needed  She is already started acidification with cranberry in vitamin-C as a preventive  Orders:  -     POCT urine dip auto non-scope    Need for influenza vaccination  -     SECOND LINE (MULTI-DOSE VIAL): influenza vaccine, 7432-2350, quadrivalent, 0 5 mL (AFLURIA, FLULAVAL, FLUZONE)            There are no Patient Instructions on file for this visit                    Gaetano Calvo

## 2018-12-05 ENCOUNTER — OFFICE VISIT (OUTPATIENT)
Dept: FAMILY MEDICINE CLINIC | Facility: CLINIC | Age: 53
End: 2018-12-05
Payer: COMMERCIAL

## 2018-12-05 ENCOUNTER — TELEPHONE (OUTPATIENT)
Dept: FAMILY MEDICINE CLINIC | Facility: CLINIC | Age: 53
End: 2018-12-05

## 2018-12-05 VITALS
TEMPERATURE: 100.4 F | RESPIRATION RATE: 12 BRPM | HEIGHT: 61 IN | DIASTOLIC BLOOD PRESSURE: 78 MMHG | SYSTOLIC BLOOD PRESSURE: 124 MMHG | HEART RATE: 64 BPM | BODY MASS INDEX: 27 KG/M2 | WEIGHT: 143 LBS

## 2018-12-05 DIAGNOSIS — N39.0 URINARY TRACT INFECTION WITH HEMATURIA, SITE UNSPECIFIED: Primary | ICD-10-CM

## 2018-12-05 DIAGNOSIS — N39.0 RECURRENT URINARY TRACT INFECTION: ICD-10-CM

## 2018-12-05 DIAGNOSIS — N30.01 ACUTE CYSTITIS WITH HEMATURIA: ICD-10-CM

## 2018-12-05 DIAGNOSIS — N10 ACUTE PYELONEPHRITIS: ICD-10-CM

## 2018-12-05 DIAGNOSIS — R31.9 URINARY TRACT INFECTION WITH HEMATURIA, SITE UNSPECIFIED: Primary | ICD-10-CM

## 2018-12-05 LAB
SL AMB  POCT GLUCOSE, UA: ABNORMAL
SL AMB LEUKOCYTE ESTERASE,UA: ABNORMAL
SL AMB POCT BILIRUBIN,UA: ABNORMAL
SL AMB POCT BLOOD,UA: 50
SL AMB POCT CLARITY,UA: CLEAR
SL AMB POCT COLOR,UA: ABNORMAL
SL AMB POCT KETONES,UA: ABNORMAL
SL AMB POCT NITRITE,UA: ABNORMAL
SL AMB POCT PH,UA: 9
SL AMB POCT SPECIFIC GRAVITY,UA: 1.01
SL AMB POCT URINE PROTEIN: ABNORMAL
SL AMB POCT UROBILINOGEN: ABNORMAL

## 2018-12-05 PROCEDURE — 3008F BODY MASS INDEX DOCD: CPT | Performed by: NURSE PRACTITIONER

## 2018-12-05 PROCEDURE — 99213 OFFICE O/P EST LOW 20 MIN: CPT | Performed by: NURSE PRACTITIONER

## 2018-12-05 PROCEDURE — 81003 URINALYSIS AUTO W/O SCOPE: CPT | Performed by: NURSE PRACTITIONER

## 2018-12-05 RX ORDER — NITROFURANTOIN 25; 75 MG/1; MG/1
100 CAPSULE ORAL 2 TIMES DAILY
Qty: 14 CAPSULE | Refills: 3 | Status: SHIPPED | OUTPATIENT
Start: 2018-12-05 | End: 2018-12-27 | Stop reason: SDUPTHER

## 2018-12-05 RX ORDER — NITROFURANTOIN 25; 75 MG/1; MG/1
100 CAPSULE ORAL 2 TIMES DAILY
Qty: 14 CAPSULE | Refills: 0 | Status: SHIPPED | OUTPATIENT
Start: 2018-12-05 | End: 2019-02-26 | Stop reason: ALTCHOICE

## 2018-12-05 NOTE — TELEPHONE ENCOUNTER
Tiffany Montezt    Patient saw you last week for uti symptoms  She has been running 100 4 temp, last night was 99  Also, feels like she is not emptying bladder fully  Requested appointment today  I told her nothing available, suggested [address] [facility] or [facility]  Also offered tomorrow am, but she has mammogram appointment scheduled  Patient is requesting call back from Jaylan Cadena to discuss

## 2018-12-05 NOTE — PROGRESS NOTES
Chief Complaint   Patient presents with    Urinary Tract Infection        Patient ID: Michael Esquivel is a 48 y o  female  Patient reports new onset last night noticing a fever  She has a past history of a spina bifida defect which makes it difficult for her to completely empty her bladder  She has seen Urology in the past but she thinks her urologist is retired now  She also has a recent past history of frequent urinary tract infections  She has a tendency to grow Cipro resistant E coli  She does respond well to Macrobid therapy  She denies GI upset, nausea or vomiting  Patient also notes over the recent past having some passive urinary leakage  She is postmenopausal   She has instituted in acidification regimen to include vitamin C and cranberry tablets however she is still manifesting frequent recurrent urinary tract infections  Past Medical History:   Diagnosis Date    Benign essential hypertension 10/09/2009    Contact lens/glasses fitting     Disorder of arteries and arterioles ([facility] [address] ) 2009    Hypertension     PONV (postoperative nausea and vomiting)     Tethered spinal cord (HCC)     from the left knee 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)       Past Surgical History:   Procedure Laterality Date     SECTION      x3; 1998, [address] [facility],     COLONOSCOPY N/A 7/10/2017    Procedure: COLONOSCOPY;  Surgeon: Ifeoma Lantigua MD;  Location: [facility];   Service: Gastroenterology    WISDOM TOOTH EXTRACTION         Patient Active Problem List   Diagnosis    Abnormal ECG    Benign essential hypertension    Primary familial hypertrophic cardiomyopathy ([address] )    Raynaud's syndrome without gangrene    Spina bifida of lumbar spine ([address] )    Constipation    Dense breast tissue    Breast cancer screening       Family History   Problem Relation Age of Onset    No Known Problems Mother     Cancer Father         liver    Heart disease Brother         hypotropic cardiomyopathy    Cardiomyopathy Brother     Sudden death Brother         cardiac (SCD)    Other Brother         idiopathic hypertrophic subarotic stenosis    Breast cancer Sister     Cancer Maternal Grandmother        Immunization History   Administered Date(s) Administered    Influenza Quadrivalent Preservative Free 3 years and older IM 12/21/2016    Influenza TIV (IM) 11/29/2010, 12/03/2012, 11/18/2013, 12/04/2014, 12/09/2015, 10/13/2017    TD (adult) Preservative Free 05/14/2014    Tdap 11/20/2009    influenza, injectable, quadrivalent 11/29/2018       No Known Allergies    Current Outpatient Prescriptions   Medication Sig Dispense Refill    b complex vitamins tablet Take 1 tablet by mouth every morning      Biotin 1 MG CAPS Take 1 capsule by mouth daily      BLACK COHOSH PO Take by mouth every morning      Calcium Citrate-Vitamin D (CALCIUM + D PO) Take by mouth every morning      EQL EVENING PRIMROSE OIL PO Take by mouth      Lactobacillus (ACIDOPHILUS PO) Take by mouth every morning      multivitamin (THERAGRAN) TABS Take 1 tablet by mouth every morning      NIFEdipine ER (ADALAT CC) 30 MG 24 hr tablet TAKE 1 TABLET BY MOUTH  DAILY 90 tablet 1    Omega-3 Fatty Acids (FISH OIL) 1,000 mg by Does not apply route      sertraline (ZOLOFT) 50 mg tablet Take 50 mg by mouth daily      vitamin E, tocopherol, 400 units capsule Take 400 Units by mouth every morning      nitrofurantoin (MACROBID) 100 mg capsule Take 1 capsule (100 mg total) by mouth 2 (two) times a day 14 capsule 3    nitrofurantoin (MACROBID) 100 mg capsule Take 1 capsule (100 mg total) by mouth 2 (two) times a day 14 capsule 0    sertraline (ZOLOFT) 50 mg tablet TAKE 1 TABLET BY MOUTH  EVERY MORNING (Patient not taking: Reported on 11/29/2018) 90 tablet 1     No current facility-administered medications for this visit          Social History     Social History    Marital status: /Civil Union     Spouse name: N/A    Number of children: N/A  Years of education: N/A     Social History Main Topics    Smoking status: Never Smoker    Smokeless tobacco: Never Used      Comment: former smoker (As per allscripts)    Alcohol use Yes      Comment: seldom; denied: history of alcohol use (as per allscripts);  no alcohol use (as per allscripts)    Drug use: No    Sexual activity: Not Asked     Other Topics Concern    None     Social History Narrative    Daily coffee consumption (2 cups/day)    Exercise: walking    Pets/animals: Cat    Pets/animals: Dog    Sleeps 8-10 hours a day       Review of Systems   Constitutional: Positive for fever  HENT: Negative  Eyes: Negative  Respiratory: Negative  Cardiovascular: Negative  Gastrointestinal: Negative  Genitourinary: Positive for dysuria  Objective:    /78 (BP Location: Left arm, Patient Position: Sitting, Cuff Size: Standard)   Pulse 64   Temp 100 4 °F (38 °C) (Temporal)   Resp 12   Ht 5' 1" (1 549 m)   Wt 64 9 kg (143 lb)   BMI 27 02 kg/m²        Physical Exam   Constitutional: She appears well-developed and well-nourished  HENT:   Head: Normocephalic  Eyes: Conjunctivae are normal    Abdominal: There is no tenderness  Assessment/Plan:    No problem-specific Assessment & Plan notes found for this encounter  Diagnoses and all orders for this visit:    Urinary tract infection with hematuria, site unspecified  Comments:  Patient typically responds Macrobid  Resume Macrobid  Recommend urology eval for frequent recurrent UTIs  Orders:  -     POCT urine dip auto non-scope  -     UA w Reflex to Microscopic w Reflex to Culture - Clinic Collect  -     nitrofurantoin (MACROBID) 100 mg capsule; Take 1 capsule (100 mg total) by mouth 2 (two) times a day  -     Cancel: Ambulatory referral to Urology; Future  -     nitrofurantoin (MACROBID) 100 mg capsule;  Take 1 capsule (100 mg total) by mouth 2 (two) times a day  -     Ambulatory referral to Urology; Future    Acute pyelonephritis    Acute cystitis with hematuria  Comments:  Recurrence with Cipro resistant E coli  Will use Macrobid twice a day for seven days  Add acidification regimen  Cranberry +500 of vit C b i d  Orders:  -     nitrofurantoin (MACROBID) 100 mg capsule; Take 1 capsule (100 mg total) by mouth 2 (two) times a day  -     Cancel: Ambulatory referral to Urology; Future  -     nitrofurantoin (MACROBID) 100 mg capsule; Take 1 capsule (100 mg total) by mouth 2 (two) times a day  -     Ambulatory referral to Urology; Future    Recurrent urinary tract infection  Comments:  Continue acidification regimen  Referral to Urology  Orders:  -     nitrofurantoin (MACROBID) 100 mg capsule; Take 1 capsule (100 mg total) by mouth 2 (two) times a day  -     Cancel: Ambulatory referral to Urology; Future  -     nitrofurantoin (MACROBID) 100 mg capsule; Take 1 capsule (100 mg total) by mouth 2 (two) times a day  -     Ambulatory referral to Urology; Future      ER instructions reviewed with patient returned demonstration understanding of same  There are no Patient Instructions on file for this visit                    Isatu Sim

## 2018-12-13 ENCOUNTER — TELEPHONE (OUTPATIENT)
Dept: FAMILY MEDICINE CLINIC | Facility: CLINIC | Age: 53
End: 2018-12-13

## 2018-12-13 NOTE — TELEPHONE ENCOUNTER
----- Message from Bibi Masterson, [address] sent at 12/12/2018  6:31 PM EST -----  Please find out what happened to the urine culture sample on this pt      Lab states that they never received it

## 2018-12-19 LAB
ALBUMIN SERPL-MCNC: 4.4 G/DL (ref 3.5–5.5)
ALBUMIN/CREAT UR: 35.9 MG/G CREAT (ref 0–30)
ALBUMIN/GLOB SERPL: 1.9 {RATIO} (ref 1.2–2.2)
ALP SERPL-CCNC: 64 IU/L (ref 39–117)
ALT SERPL-CCNC: 18 IU/L (ref 0–32)
AST SERPL-CCNC: 19 IU/L (ref 0–40)
BILIRUB SERPL-MCNC: 0.3 MG/DL (ref 0–1.2)
BUN SERPL-MCNC: 17 MG/DL (ref 6–24)
BUN/CREAT SERPL: 19 (ref 9–23)
CALCIUM SERPL-MCNC: 9.5 MG/DL (ref 8.7–10.2)
CHLORIDE SERPL-SCNC: 102 MMOL/L (ref 96–106)
CHOLEST SERPL-MCNC: 170 MG/DL (ref 100–199)
CO2 SERPL-SCNC: 23 MMOL/L (ref 20–29)
CREAT SERPL-MCNC: 0.89 MG/DL (ref 0.57–1)
CREAT UR-MCNC: 119.7 MG/DL
GLOBULIN SER-MCNC: 2.3 G/DL (ref 1.5–4.5)
GLUCOSE SERPL-MCNC: 88 MG/DL (ref 65–99)
HDLC SERPL-MCNC: 65 MG/DL
LABCORP COMMENT: NORMAL
LDLC SERPL CALC-MCNC: 95 MG/DL (ref 0–99)
MICROALBUMIN UR-MCNC: 43 UG/ML
MICRODELETION SYND BLD/T FISH: NORMAL
POTASSIUM SERPL-SCNC: 4.3 MMOL/L (ref 3.5–5.2)
PROT SERPL-MCNC: 6.7 G/DL (ref 6–8.5)
SL AMB EGFR AFRICAN AMERICAN: 86 ML/MIN/1.73
SL AMB EGFR NON AFRICAN AMERICAN: 74 ML/MIN/1.73
SL AMB VLDL CHOLESTEROL CALC: 10 MG/DL (ref 5–40)
SODIUM SERPL-SCNC: 142 MMOL/L (ref 134–144)
TRIGL SERPL-MCNC: 51 MG/DL (ref 0–149)
TSH SERPL DL<=0.005 MIU/L-ACNC: 1.92 UIU/ML (ref 0.45–4.5)

## 2018-12-27 ENCOUNTER — OFFICE VISIT (OUTPATIENT)
Dept: FAMILY MEDICINE CLINIC | Facility: CLINIC | Age: 53
End: 2018-12-27
Payer: COMMERCIAL

## 2018-12-27 VITALS
TEMPERATURE: 98.1 F | HEIGHT: 61 IN | WEIGHT: 143 LBS | RESPIRATION RATE: 12 BRPM | BODY MASS INDEX: 27 KG/M2 | DIASTOLIC BLOOD PRESSURE: 70 MMHG | SYSTOLIC BLOOD PRESSURE: 102 MMHG | HEART RATE: 72 BPM

## 2018-12-27 DIAGNOSIS — Z01.419 ENCOUNTER FOR GYNECOLOGICAL EXAMINATION WITHOUT ABNORMAL FINDING: ICD-10-CM

## 2018-12-27 DIAGNOSIS — N39.0 URINARY TRACT INFECTION WITH HEMATURIA, SITE UNSPECIFIED: ICD-10-CM

## 2018-12-27 DIAGNOSIS — N39.0 RECURRENT URINARY TRACT INFECTION: ICD-10-CM

## 2018-12-27 DIAGNOSIS — R35.0 FREQUENT URINATION: Primary | ICD-10-CM

## 2018-12-27 DIAGNOSIS — R31.9 URINARY TRACT INFECTION WITH HEMATURIA, SITE UNSPECIFIED: ICD-10-CM

## 2018-12-27 DIAGNOSIS — Z00.00 ROUTINE PHYSICAL EXAMINATION: ICD-10-CM

## 2018-12-27 DIAGNOSIS — N30.01 ACUTE CYSTITIS WITH HEMATURIA: ICD-10-CM

## 2018-12-27 LAB
SL AMB  POCT GLUCOSE, UA: ABNORMAL
SL AMB LEUKOCYTE ESTERASE,UA: 500
SL AMB POCT BILIRUBIN,UA: ABNORMAL
SL AMB POCT BLOOD,UA: ABNORMAL
SL AMB POCT CLARITY,UA: CLEAR
SL AMB POCT COLOR,UA: YELLOW
SL AMB POCT KETONES,UA: ABNORMAL
SL AMB POCT NITRITE,UA: ABNORMAL
SL AMB POCT PH,UA: 6
SL AMB POCT SPECIFIC GRAVITY,UA: 1.01
SL AMB POCT URINE PROTEIN: ABNORMAL
SL AMB POCT UROBILINOGEN: ABNORMAL

## 2018-12-27 PROCEDURE — 81003 URINALYSIS AUTO W/O SCOPE: CPT | Performed by: NURSE PRACTITIONER

## 2018-12-27 PROCEDURE — 99396 PREV VISIT EST AGE 40-64: CPT | Performed by: NURSE PRACTITIONER

## 2018-12-27 RX ORDER — NITROFURANTOIN 25; 75 MG/1; MG/1
100 CAPSULE ORAL 2 TIMES DAILY
Qty: 14 CAPSULE | Refills: 0 | Status: SHIPPED | OUTPATIENT
Start: 2018-12-27 | End: 2019-02-26 | Stop reason: ALTCHOICE

## 2018-12-27 NOTE — PROGRESS NOTES
Chief Complaint   Patient presents with    Physical Exam     with pap        Patient ID: Von Pressley is a 48 y o  female  Patient is here today for routine physical and Pap smear  Patient denies any gynecologic symptoms of concern today but she does notice some urinary frequency again  Urine dip in the office shows positive nitrite and leukocytes  Patient denies fever any additional systemic symptoms of concern  No new sexual partners over the last 20 years  Menopause at age 52  Patient denies any postmenopausal bleeding  Last Pap was approximately one year ago patient denies a past medical history of abnormal Pap smears or MOI exposure  Patient denies any known past medical history of HPV infection  Last Pap was done at [facility] in [place] and was normal per the patient's report  The new Pap guidelines discussed, patient would like to move forward with Pap today regardless  Past Medical History:   Diagnosis Date    Benign essential hypertension 10/09/2009    Contact lens/glasses fitting     Disorder of arteries and arterioles ([address] ) 2009    Hypertension     PONV (postoperative nausea and vomiting)     Tethered spinal cord (HCC)     from the left knee 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)       Past Surgical History:   Procedure Laterality Date     SECTION      x3; , [facility],     COLONOSCOPY N/A 7/10/2017    Procedure: COLONOSCOPY;  Surgeon: Sandra Gay MD;  Location: [place] [address] GI LAB;   Service: Gastroenterology    WISDOM TOOTH EXTRACTION         Patient Active Problem List   Diagnosis    Abnormal ECG    Benign essential hypertension    Primary familial hypertrophic cardiomyopathy ([address] )    Raynaud's syndrome without gangrene    Spina bifida of lumbar spine ([address] )    Constipation    Dense breast tissue    Breast cancer screening       Family History   Problem Relation Age of Onset    No Known Problems Mother     Cancer Father         liver    Heart disease Brother         hypotropic cardiomyopathy    Cardiomyopathy Brother     Sudden death Brother         cardiac (SCD)    Other Brother         idiopathic hypertrophic subarotic stenosis    Breast cancer Sister     Cancer Maternal Grandmother        Immunization History   Administered Date(s) Administered    Influenza Quadrivalent Preservative Free 3 years and older IM 12/21/2016    Influenza TIV (IM) 11/29/2010, 12/03/2012, 11/18/2013, 12/04/2014, 12/09/2015, 10/13/2017    TD (adult) Preservative Free 05/14/2014    Tdap 11/20/2009    influenza, injectable, quadrivalent 11/29/2018       No Known Allergies    Current Outpatient Prescriptions   Medication Sig Dispense Refill    b complex vitamins tablet Take 1 tablet by mouth every morning      Biotin 1 MG CAPS Take 1 capsule by mouth daily      BLACK COHOSH PO Take by mouth every morning      Calcium Citrate-Vitamin D (CALCIUM + D PO) Take by mouth every morning      EQL EVENING PRIMROSE OIL PO Take by mouth      Lactobacillus (ACIDOPHILUS PO) Take by mouth every morning      multivitamin (THERAGRAN) TABS Take 1 tablet by mouth every morning      NIFEdipine ER (ADALAT CC) 30 MG 24 hr tablet TAKE 1 TABLET BY MOUTH  DAILY 90 tablet 1    Omega-3 Fatty Acids (FISH OIL) 1,000 mg by Does not apply route      sertraline (ZOLOFT) 50 mg tablet Take 50 mg by mouth daily      vitamin E, tocopherol, 400 units capsule Take 400 Units by mouth every morning      nitrofurantoin (MACROBID) 100 mg capsule Take 1 capsule (100 mg total) by mouth 2 (two) times a day (Patient not taking: Reported on 12/27/2018 ) 14 capsule 0    nitrofurantoin (MACROBID) 100 mg capsule Take 1 capsule (100 mg total) by mouth 2 (two) times a day 14 capsule 0     No current facility-administered medications for this visit          Social History     Social History    Marital status: /Civil Union     Spouse name: N/A    Number of children: N/A    Years of education: N/A     Social History Main Topics    Smoking status: Never Smoker    Smokeless tobacco: Never Used      Comment: former smoker (As per allscripts)    Alcohol use Yes      Comment: seldom; denied: history of alcohol use (as per allscripts);  no alcohol use (as per allscripts)    Drug use: No    Sexual activity: Not Asked     Other Topics Concern    None     Social History Narrative    Daily coffee consumption (2 cups/day)    Exercise: walking    Pets/animals: Cat    Pets/animals: Dog    Sleeps 8-10 hours a day       Review of Systems   Constitutional: Negative  HENT: Negative  Eyes: Negative  Respiratory: Negative  Cardiovascular: Negative  Gastrointestinal: Negative  Endocrine: Negative  Genitourinary: Positive for difficulty urinating and dysuria  Negative for pelvic pain, vaginal bleeding, vaginal discharge and vaginal pain  Musculoskeletal: Negative  Skin: Negative  Allergic/Immunologic: Negative  Neurological: Negative  Hematological: Negative  Psychiatric/Behavioral: Negative  Objective:    /70 (BP Location: Left arm, Patient Position: Sitting, Cuff Size: Standard)   Pulse 72   Temp 98 1 °F (36 7 °C) (Temporal)   Resp 12   Ht 5' 1" (1 549 m)   Wt 64 9 kg (143 lb)   BMI 27 02 kg/m²        Physical Exam   Constitutional: She appears well-developed and well-nourished  No distress  HENT:   Head: Normocephalic and atraumatic  Right Ear: External ear normal    Left Ear: External ear normal    Nose: Nose normal    Mouth/Throat: Oropharynx is clear and moist    Eyes: Conjunctivae are normal  No scleral icterus  Neck: Normal range of motion  Neck supple  No JVD present  No thyromegaly present  Cardiovascular: Normal rate, regular rhythm and normal heart sounds  Pulmonary/Chest: Effort normal and breath sounds normal    Breast Exam: Normal, no dimpling or skin changes, no masses or nipple exudate  Abdominal: Soft   Bowel sounds are normal  She exhibits no distension and no mass  There is no tenderness  Genitourinary:   Genitourinary Comments: External genitalia and vagina: Normal, no lesions appreciated  Urethra: Normal, no discharge  Bladder: Not distended,no tenderness  Cervix: Normal, no lesions, Pap obtained  Uterus: Normal size, no tenderness, no masses  Adnexa/Parametria: Normal, no masses or tenderness  Lymphatic: No lymphadenopathy at the neck, axillae or groin  Rectal exam: No mass, normal muscle tone, stool for occult blood negative  Lymphadenopathy:     She has no cervical adenopathy  On bimanual exam the bladder is not empty post recent void  No discomfort  Assessment/Plan:    No problem-specific Assessment & Plan notes found for this encounter  Diagnoses and all orders for this visit:    Frequent urination  -     POCT urine dip auto non-scope    Urinary tract infection with hematuria, site unspecified  Comments:  Patient typically responds Macrobid  Resume Macrobid  Recommend urology eval for frequent recurrent UTIs  Orders:  -     nitrofurantoin (MACROBID) 100 mg capsule; Take 1 capsule (100 mg total) by mouth 2 (two) times a day    Acute cystitis with hematuria  Comments:  Recurrence with Cipro resistant E coli  Will use Macrobid twice a day for seven days  Add acidification regimen  Cranberry +500 of vit C b i d  Orders:  -     nitrofurantoin (MACROBID) 100 mg capsule; Take 1 capsule (100 mg total) by mouth 2 (two) times a day    Recurrent urinary tract infection  Comments:  Continue acidification regimen  Referral to Urology  Orders:  -     nitrofurantoin (MACROBID) 100 mg capsule; Take 1 capsule (100 mg total) by mouth 2 (two) times a day    Encounter for gynecological examination without abnormal finding  Comments:  Pap collected  Orders:  -     Pap IG, Ct-Ng, rfx HPV all; Future    Routine physical examination  Comments:  Recent serum studies reviewed          Mild elevation in microalbumin creatinine level at 35  Some concern for outflow issue and residual bladder volume as a feet forward for recurrent urinary tract infection and possible elevation microalbumin creatinine level  Patient is referred to Urology with her results for further eval     There are no Patient Instructions on file for this visit                    Ramona Correa

## 2019-01-02 LAB
C TRACH RRNA CVX QL NAA+PROBE: NEGATIVE
CYTOLOGIST CVX/VAG CYTO: NORMAL
LABCORP COMMENT: NORMAL
N GONORRHOEA RRNA CVX QL NAA+PROBE: NEGATIVE
OTHER STN SPEC: NORMAL
OTHER STN SPEC: NORMAL
PATH REPORT.FINAL DX SPEC: NORMAL
SL AMB NOTE:: NORMAL
SL AMB SPECIMEN ADEQUACY: NORMAL
SL AMB TEST METHODOLOGY: NORMAL

## 2019-01-15 ENCOUNTER — OFFICE VISIT (OUTPATIENT)
Dept: UROLOGY | Facility: AMBULATORY SURGERY CENTER | Age: 54
End: 2019-01-15
Payer: COMMERCIAL

## 2019-01-15 ENCOUNTER — TELEPHONE (OUTPATIENT)
Dept: UROLOGY | Facility: AMBULATORY SURGERY CENTER | Age: 54
End: 2019-01-15

## 2019-01-15 VITALS — WEIGHT: 141 LBS | BODY MASS INDEX: 25.95 KG/M2 | HEIGHT: 62 IN

## 2019-01-15 DIAGNOSIS — N39.0 URINARY TRACT INFECTION WITH HEMATURIA, SITE UNSPECIFIED: Primary | ICD-10-CM

## 2019-01-15 DIAGNOSIS — N30.01 ACUTE CYSTITIS WITH HEMATURIA: ICD-10-CM

## 2019-01-15 DIAGNOSIS — N39.0 RECURRENT URINARY TRACT INFECTION: ICD-10-CM

## 2019-01-15 DIAGNOSIS — R33.9 RETENTION, URINE: ICD-10-CM

## 2019-01-15 DIAGNOSIS — R31.9 URINARY TRACT INFECTION WITH HEMATURIA, SITE UNSPECIFIED: Primary | ICD-10-CM

## 2019-01-15 LAB
POST-VOID RESIDUAL VOLUME, ML POC: 312 ML
SL AMB  POCT GLUCOSE, UA: NORMAL
SL AMB LEUKOCYTE ESTERASE,UA: NORMAL
SL AMB POCT BILIRUBIN,UA: NORMAL
SL AMB POCT BLOOD,UA: NORMAL
SL AMB POCT CLARITY,UA: NORMAL
SL AMB POCT COLOR,UA: YELLOW
SL AMB POCT KETONES,UA: NORMAL
SL AMB POCT NITRITE,UA: NORMAL
SL AMB POCT PH,UA: 5
SL AMB POCT SPECIFIC GRAVITY,UA: 1.02
SL AMB POCT URINE PROTEIN: NORMAL
SL AMB POCT UROBILINOGEN: NORMAL

## 2019-01-15 PROCEDURE — 81002 URINALYSIS NONAUTO W/O SCOPE: CPT | Performed by: UROLOGY

## 2019-01-15 PROCEDURE — 99245 OFF/OP CONSLTJ NEW/EST HI 55: CPT | Performed by: UROLOGY

## 2019-01-15 PROCEDURE — 51798 US URINE CAPACITY MEASURE: CPT | Performed by: UROLOGY

## 2019-01-15 NOTE — PROGRESS NOTES
1/15/2019    Juaquin Kenyon  1965  [id number]        Assessment  History of tethered cord, possible neurogenic bladder history of UTI, elevated postvoid residual      Discussion  Recommend obtaining a urinalysis and urine culture at this time to exclude infection versus colonization  I will obtain a retroperitoneal ultrasound of the kidneys and bladder with postvoid residual assessment to reassess her residual   I will hold on repeating another basic metabolic panel as a recent creatinine is 0 89  I also had a discussion with the patient regarding the likely necessity of performing clean intermittent catheterization at least twice per day if not more  We will readdress this when she returns in follow-up after her urine testing and ultrasound of her kidneys have been completed  History of Present Illness  48 y o  female with a history of a tethered cord diagnosed in her 25s  She presented with lower extremity weakness  She has been followed by urologist over the years in [place] and has been told that she has elevated postvoid residuals  In the office today her residuals 312 cc  She does not always feel that she is retaining urine  A recent urine culture from late 2018 reveals a relatively pansensitive E coli  She denies any lower urinary tract symptoms or signs of infection at this time  She denies any hematuria  AUA Symptom Score      Review of Systems  Review of Systems   Constitutional: Negative  HENT: Negative  Eyes: Negative  Respiratory: Negative  Cardiovascular: Negative  Gastrointestinal: Negative  Endocrine: Negative  Genitourinary:        Per HPI   Musculoskeletal: Negative  Skin: Negative  Allergic/Immunologic: Negative  Neurological: Negative  Hematological: Negative  Psychiatric/Behavioral: Negative            Past Medical History  Past Medical History:   Diagnosis Date    Benign essential hypertension 10/09/2009    Contact lens/glasses fitting     Disorder of arteries [address]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 [address] [address] ) 2009    Hypertension     PONV (postoperative nausea and vomiting)     Tethered spinal cord (HCC)     from the left knee 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)       Past Social History  Past Surgical History:   Procedure Laterality Date     SECTION      x3; , ,     COLONOSCOPY N/A 7/10/2017    Procedure: COLONOSCOPY;  Surgeon: Adrián Loera MD;  Location: [place] [address] GI LAB; Service: Gastroenterology    WISDOM TOOTH EXTRACTION         Past Family History  Family History   Problem Relation Age of Onset    No Known Problems Mother     Cancer Father         liver    Heart disease Brother         hypotropic cardiomyopathy    Cardiomyopathy Brother     Sudden death Brother         cardiac (SCD)    Other Brother         idiopathic hypertrophic subarotic stenosis    Breast cancer Sister     Cancer Maternal Grandmother        Past Social history  Social History     Social History    Marital status: /Civil Union     Spouse name: N/A    Number of children: N/A    Years of education: N/A     Occupational History    Not on file       Social History Main Topics    Smoking status: Never Smoker    Smokeless tobacco: Never Used      Comment: former smoker (As per allscripts)    Alcohol use Yes      Comment: seldom; denied: history of alcohol use (as per allscripts);  no alcohol use (as per allscripts)    Drug use: No    Sexual activity: Not on file     Other Topics Concern    Not on file     Social History Narrative    Daily coffee consumption (2 cups/day)    Exercise: walking    Pets/animals: Cat    Pets/animals: Dog    Sleeps 8-10 hours a day       Current Medications  Current Outpatient Prescriptions   Medication Sig Dispense Refill    b complex vitamins tablet Take 1 tablet by mouth every morning      Biotin 1 MG CAPS Take 1 capsule by mouth daily      BLACK COHOSH PO Take by mouth every morning      Calcium Citrate-Vitamin D (CALCIUM + D PO) Take by mouth every morning      EQL EVENING PRIMROSE OIL PO Take by mouth      Lactobacillus (ACIDOPHILUS PO) Take by mouth every morning      multivitamin (THERAGRAN) TABS Take 1 tablet by mouth every morning      NIFEdipine ER (ADALAT CC) 30 MG 24 hr tablet TAKE 1 TABLET BY MOUTH  DAILY 90 tablet 1    nitrofurantoin (MACROBID) 100 mg capsule Take 1 capsule (100 mg total) by mouth 2 (two) times a day 14 capsule 0    Omega-3 Fatty Acids (FISH OIL) 1,000 mg by Does not apply route      sertraline (ZOLOFT) 50 mg tablet Take 50 mg by mouth daily      vitamin E, tocopherol, 400 units capsule Take 400 Units by mouth every morning      nitrofurantoin (MACROBID) 100 mg capsule Take 1 capsule (100 mg total) by mouth 2 (two) times a day (Patient not taking: Reported on 1/15/2019 ) 14 capsule 0     No current facility-administered medications for this visit  Allergies  No Known Allergies    Past Medical History, Social History, Family History, medications and allergies were reviewed  Vitals  Vitals:    01/15/19 1121   Weight: 64 kg (141 lb)   Height: 5' 2" (1 575 m)       Physical Exam  Physical Exam  On examination she is in no acute distress  Her abdomen is soft nontender nondistended   examination reveals no CVA tenderness  Her bladder is nonpalpable despite the residual of 312  Skin is warm  Extremities without edema    Neurologic is grossly intact and nonfocal   Gait normal   Affect normal      Results  No results found for: PSA  Lab Results   Component Value Date    GLUCOSE 86 12/20/2017    CALCIUM 9 1 12/20/2017     12/20/2017    K 4 3 12/18/2018    CO2 23 12/18/2018     12/18/2018    BUN 17 12/18/2018    CREATININE 0 83 12/20/2017     No results found for: WBC, HGB, HCT, MCV, PLT      Office Urine Dip  Recent Results (from the past 1 hour(s))   POCT urine dip    Collection Time: 01/15/19 11:29 AM   Result Value Ref Range    LEUKOCYTE ESTERASE,UA -     NITRITE,UA -     SL AMB POCT UROBILINOGEN -     POCT URINE PROTEIN -      PH,UA 5 0     BLOOD,UA -     SPECIFIC GRAVITY,UA 1 025     [facility] -     GLUCOSE, UA -      COLOR,UA yellow     CLARITY,UA transparent    POCT Measure PVR    Collection Time: 01/15/19 11:29 AM   Result Value Ref Range    POST-VOID RESIDUAL VOLUME, ML  mL   ]

## 2019-01-16 ENCOUNTER — HOSPITAL ENCOUNTER (OUTPATIENT)
Dept: RADIOLOGY | Facility: HOSPITAL | Age: 54
Discharge: HOME | End: 2019-01-16
Attending: UROLOGY
Payer: COMMERCIAL

## 2019-01-16 DIAGNOSIS — R33.9 RETENTION, URINE: ICD-10-CM

## 2019-01-16 PROCEDURE — 76770 US EXAM ABDO BACK WALL COMP: CPT

## 2019-01-16 PROCEDURE — 51798 US URINE CAPACITY MEASURE: CPT

## 2019-01-16 NOTE — TELEPHONE ENCOUNTER
Appt scheduled on 1/30 at 2:00 pm in the [place] office with Dr Miguel Marc  Please call patient with appt details

## 2019-01-17 LAB
APPEARANCE UR: CLEAR
BACTERIA UR CULT: NORMAL
BACTERIA URNS QL MICRO: NORMAL
BILIRUB UR QL STRIP: NEGATIVE
COLOR UR: YELLOW
EPI CELLS #/AREA URNS HPF: NORMAL /HPF
GLUCOSE UR QL: NEGATIVE
HGB UR QL STRIP: NEGATIVE
KETONES UR QL STRIP: NEGATIVE
LEUKOCYTE ESTERASE UR QL STRIP: NEGATIVE
Lab: NO GROWTH
MICRO URNS: NORMAL
MICRO URNS: NORMAL
MUCOUS THREADS URNS QL MICRO: PRESENT
NITRITE UR QL STRIP: NEGATIVE
PH UR STRIP: 7 [PH] (ref 5–7.5)
PROT UR QL STRIP: NEGATIVE
RBC #/AREA URNS HPF: NORMAL /HPF
SP GR UR: 1.01 (ref 1–1.03)
UROBILINOGEN UR STRIP-ACNC: 0.2 EU/DL (ref 0.2–1)
WBC #/AREA URNS HPF: NORMAL /HPF

## 2019-01-30 ENCOUNTER — TELEPHONE (OUTPATIENT)
Dept: UROLOGY | Facility: AMBULATORY SURGERY CENTER | Age: 54
End: 2019-01-30

## 2019-01-30 ENCOUNTER — OFFICE VISIT (OUTPATIENT)
Dept: UROLOGY | Facility: AMBULATORY SURGERY CENTER | Age: 54
End: 2019-01-30
Payer: COMMERCIAL

## 2019-01-30 VITALS
SYSTOLIC BLOOD PRESSURE: 118 MMHG | DIASTOLIC BLOOD PRESSURE: 70 MMHG | BODY MASS INDEX: 27.23 KG/M2 | HEIGHT: 62 IN | HEART RATE: 66 BPM | WEIGHT: 148 LBS

## 2019-01-30 DIAGNOSIS — N31.9 NEUROGENIC BLADDER: Primary | ICD-10-CM

## 2019-01-30 PROCEDURE — 99213 OFFICE O/P EST LOW 20 MIN: CPT | Performed by: UROLOGY

## 2019-01-30 NOTE — TELEPHONE ENCOUNTER
Return in about 4 weeks (around 2/27/2019) for UDS with RN, f/u after UDS with FT, appt with RN for CIC teaching   PER DR JOHNSON SCHEDULE BOTH NURSE APPT SAME DAY, PT PREFERS Hanley Frankel

## 2019-01-30 NOTE — PROGRESS NOTES
2019    Arturo Villeda  1965  [id number]        Assessment  Neurogenic bladder history of tethered cord, mild right hydronephrosis      Discussion  I recommend urodynamics along with clean intermittent catheterization teaching  I recommend that she start with clean intermittent catheterization twice daily  We can increase from there as needed  The patient is amenable with this plan  History of Present Illness  48 y o  female with a history of tethered cord which developed in her 35s while she was pregnant  She had previously been seen by Neurology  She has trouble emptying her bladder  [facility] Center for retroperitoneal ultrasound and she returns in follow-up today  Her prevoid volume was 948 cc  Her postvoid residual was 352 cc  In addition mild right-sided hydronephrosis was appreciated  She continues to complain of occasional leakage  We discussed her ultrasound findings at length in the office today  Fortunately her creatinine is normal           AUA Symptom Score      Review of Systems  Review of Systems   Constitutional: Negative  HENT: Negative  Eyes: Negative  Respiratory: Negative  Cardiovascular: Negative  Gastrointestinal: Negative  Endocrine: Negative  Genitourinary:        Per HPI   Musculoskeletal: Negative  Skin: Negative  Allergic/Immunologic: Negative  Neurological: Negative  Hematological: Negative  Psychiatric/Behavioral: Negative            Past Medical History  Past Medical History:   Diagnosis Date    Benign essential hypertension 10/09/2009    Contact lens/glasses fitting     Disorder of arteries and arterioles ([facility] [address] ) 2009    Hypertension     PONV (postoperative nausea and vomiting)     Tethered spinal cord (HCC)     from the left knee 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)       Past Social History  Past Surgical History:   Procedure Laterality Date     SECTION      x3; , ,     COLONOSCOPY N/A 7/10/2017    Procedure: COLONOSCOPY;  Surgeon: Matthew Roberson MD;  Location: [place] [address] GI LAB; Service: Gastroenterology    WISDOM TOOTH EXTRACTION         Past Family History  Family History   Problem Relation Age of Onset    No Known Problems Mother     Cancer Father         liver    Heart disease Brother         hypotropic cardiomyopathy    Cardiomyopathy Brother     Sudden death Brother         cardiac (SCD)    Other Brother         idiopathic hypertrophic subarotic stenosis    Breast cancer Sister     Cancer Maternal Grandmother        Past Social history  Social History     Social History    Marital status: /Civil Union     Spouse name: N/A    Number of children: N/A    Years of education: N/A     Occupational History    Not on file       Social History Main Topics    Smoking status: Never Smoker    Smokeless tobacco: Never Used      Comment: former smoker (As per allscripts)    Alcohol use Yes      Comment: seldom; denied: history of alcohol use (as per allscripts);  no alcohol use (as per allscripts)    Drug use: No    Sexual activity: Not on file     Other Topics Concern    Not on file     Social History Narrative    Daily coffee consumption (2 cups/day)    Exercise: walking    Pets/animals: Cat    Pets/animals: Dog    Sleeps 8-10 hours a day       Current Medications  Current Outpatient Prescriptions   Medication Sig Dispense Refill    b complex vitamins tablet Take 1 tablet by mouth every morning      Biotin 1 MG CAPS Take 1 capsule by mouth daily      BLACK COHOSH PO Take by mouth every morning      Calcium Citrate-Vitamin D (CALCIUM + D PO) Take by mouth every morning      EQL EVENING PRIMROSE OIL PO Take by mouth      Lactobacillus (ACIDOPHILUS PO) Take by mouth every morning      multivitamin (THERAGRAN) TABS Take 1 tablet by mouth every morning      NIFEdipine ER (ADALAT CC) 30 MG 24 hr tablet TAKE 1 TABLET BY MOUTH  DAILY 90 tablet 1    nitrofurantoin (MACROBID) 100 mg capsule Take 1 capsule (100 mg total) by mouth 2 (two) times a day 14 capsule 0    Omega-3 Fatty Acids (FISH OIL) 1,000 mg by Does not apply route      sertraline (ZOLOFT) 50 mg tablet Take 50 mg by mouth daily      vitamin E, tocopherol, 400 units capsule Take 400 Units by mouth every morning      nitrofurantoin (MACROBID) 100 mg capsule Take 1 capsule (100 mg total) by mouth 2 (two) times a day (Patient not taking: Reported on 1/15/2019 ) 14 capsule 0     No current facility-administered medications for this visit  Allergies  No Known Allergies    Past Medical History, Social History, Family History, medications and allergies were reviewed  Vitals  Vitals:    01/30/19 1427   BP: 118/70   BP Location: Left arm   Patient Position: Sitting   Cuff Size: Adult   Pulse: 66   Weight: 67 1 kg (148 lb)   Height: 5' 2" (1 575 m)       Physical Exam  Physical Exam  On examination she is in no acute distress  Gait normal   Affect normal      Results  No results found for: PSA  Lab Results   Component Value Date    GLUCOSE 86 12/20/2017    CALCIUM 9 1 12/20/2017     12/20/2017    K 4 3 12/18/2018    CO2 23 12/18/2018     12/18/2018    BUN 17 12/18/2018    CREATININE 0 83 12/20/2017     No results found for: WBC, HGB, HCT, MCV, PLT      Office Urine Dip  No results found for this or any previous visit (from the past 1 hour(s))  ]      Total visit time was 15 minutes of which over 50% was spent on counseling

## 2019-01-31 NOTE — TELEPHONE ENCOUNTER
Dr Staci Deleon patient, seen at [address] but can also be seen at [facility]  H/o incontinence, incomplete bladder emptying, right hydro with normal creatinine and tethered cord  Will need urodynamics and cic teaching  First available at [place] would be 2/28/19  Called and left a message for patient to call back to discuss

## 2019-02-04 NOTE — TELEPHONE ENCOUNTER
Called and spoke with patient at length  Offered scheduling at [facility] of 2/28 however she preferred a later time in the morning and asked if there were any opening at the [address] office sooner  She was scheduled 2/26 at 1030 at the [address] office for 1 5 hr appt for UDS and cic teaching  She was scheduled FU with Dr Milli Bhatia thereafter on 3/13 also at the [address] office  Reviewed UDS testing and what to expect with patient  She verbalized understanding  She understands there is a possibility she may need to be rescheduled at the [facility] office if for some reason diagnostic nurse is out of the office

## 2019-02-26 ENCOUNTER — PROCEDURE VISIT (OUTPATIENT)
Dept: UROLOGY | Facility: AMBULATORY SURGERY CENTER | Age: 54
End: 2019-02-26
Payer: COMMERCIAL

## 2019-02-26 VITALS
SYSTOLIC BLOOD PRESSURE: 122 MMHG | BODY MASS INDEX: 27.05 KG/M2 | HEIGHT: 62 IN | WEIGHT: 147 LBS | DIASTOLIC BLOOD PRESSURE: 88 MMHG | HEART RATE: 58 BPM

## 2019-02-26 DIAGNOSIS — R33.9 RETENTION, URINE: ICD-10-CM

## 2019-02-26 DIAGNOSIS — N31.9 NEUROGENIC BLADDER: Primary | ICD-10-CM

## 2019-02-26 DIAGNOSIS — N39.0 RECURRENT URINARY TRACT INFECTION: ICD-10-CM

## 2019-02-26 LAB
SL AMB  POCT GLUCOSE, UA: NORMAL
SL AMB LEUKOCYTE ESTERASE,UA: NORMAL
SL AMB POCT BILIRUBIN,UA: NORMAL
SL AMB POCT BLOOD,UA: NORMAL
SL AMB POCT CLARITY,UA: CLEAR
SL AMB POCT COLOR,UA: YELLOW
SL AMB POCT KETONES,UA: NORMAL
SL AMB POCT NITRITE,UA: NORMAL
SL AMB POCT PH,UA: 5
SL AMB POCT SPECIFIC GRAVITY,UA: 1.01
SL AMB POCT URINE PROTEIN: NORMAL
SL AMB POCT UROBILINOGEN: NORMAL

## 2019-02-26 PROCEDURE — 51728 CYSTOMETROGRAM W/VP: CPT

## 2019-02-26 PROCEDURE — 51741 ELECTRO-UROFLOWMETRY FIRST: CPT

## 2019-02-26 PROCEDURE — 51784 ANAL/URINARY MUSCLE STUDY: CPT

## 2019-02-26 PROCEDURE — 81002 URINALYSIS NONAUTO W/O SCOPE: CPT

## 2019-02-26 PROCEDURE — 51797 INTRAABDOMINAL PRESSURE TEST: CPT

## 2019-02-26 NOTE — PATIENT INSTRUCTIONS
You had urodynamic testing completed today  · Drink plenty of fluids for the next 24 hours  · Mild burning with urination and small amount of urinary bleeding is normal  · Call the office with any fever/chills  · Follow up as scheduled to review the results of today's testing  How to Catheterize Yourself (Woman)   WHAT YOU NEED TO KNOW:   How do I catheterize myself? · Try to urinate before you catheterize yourself  · Gather all the items you will need:  Ask your healthcare provider where to get the supplies to catheterize yourself  ¨ A clean catheter    ¨ Water-based lubricating jelly    ¨ Bowl or container to collect urine    ¨ Bowl of warm water, soap, washcloth, and hand towel    ¨ Mirror and good lighting    ¨ Waterproof pad or bath towel    · Wash your hands with warm water and soap  · Get into position to insert your catheter:  Lie or sit down with your legs open and knees bent  Put a towel or waterproof pad under you  You may also sit on the toilet or stand with one foot on the edge of the toilet  Make sure the other end of the catheter is pointed into a container or down toward the toilet  · Use a mirror to find your urinary meatus:  Find your meatus (the opening where urine comes out) and vagina  · Clean yourself:  Separate the outer skin folds around your urinary meatus  Wash from front to back with soap, warm water, and a washcloth  Do not scrub up and down  This could bring germs from your anus into the vagina and urethra and cause an infection  Rinse and dry  · Put water-based lubricating jelly on the first 3 inches of the catheter: This will help decrease discomfort during the procedure  · Insert the catheter:      ¨ Hold the labia apart with one hand  Slowly put the catheter into the meatus with your other hand  ¨ Gently push the catheter about 3 inches into the urethra until urine begins to come out   Once urine starts to flow, push the catheter up 1 inch more and hold it in place until the urine stops  · Remove the catheter:  When urine no longer comes out of the catheter, pinch it closed with the hand that was holding your labia  Gently and slowly pull the catheter out  Keep the end of the catheter up to prevent dribbling of urine  · Clean your catheter: If your catheter is reusable, follow your healthcare provider's instructions to clean it  If your catheter is a single-use catheter, throw it away  When should I catheterize myself? Catheterize yourself at least 4 times each day and at bedtime  How can I help prevent an infection? · Wash your hands:  Always wash your hands with soap and water before you catheterize yourself  · Clean and dry reusable catheters:  Clean all reusable catheters with soap and warm water after every use  Sterilize all reusable catheters in a pan of boiling water for 20 minutes  Set the catheters on a clean paper towel to dry  · Store catheters correctly:  Store dry catheters in a clean plastic bag  Throw away torn, hardened, or cracked catheters  · Wear cotton underwear: These allow airflow and keep your genital area dry  · Drink plenty of liquids:  Ask your healthcare provider how much liquid to drink each day and which liquids are best for you  This helps prevent a urinary infection  What catheter problems may I have? · No urine comes out of the catheter:  Use a mirror to make sure the catheter is in your urinary meatus and not in your vagina  If the catheter is in your vagina, remove it and insert a new catheter into the meatus  If your catheter is in the urinary meatus, gently rotate the catheter in case it is blocked  Gently push the catheter a little further up into the urethra or pull it back  Check also that the catheter opening is not blocked by lubricant or mucus  · Urine leaks between catheterizations or during sex:   This may happen if you have been drinking more liquids than usual, especially those containing caffeine or alcohol  It could also mean that you have a bladder infection  If you have a problem with urine leakage, try catheterizing yourself more often  If you think you have an infection, contact your healthcare provider  Catheterize yourself before you have sex with your partner  Also, decrease the amount of liquids you normally drink 1 or 2 hours before sex  · You have difficulty inserting or removing the catheter: If you have pain or discomfort when you insert your catheter, use more lubricant  Pain or discomfort may also be caused by muscle spasms  It may help to take a deep breath and try to relax before you catheterize yourself  Breathe in, then insert or remove the catheter as you slowly let your breath out  · You have blood on the catheter or in your urine: This may happen if your meatus or urethra is too dry  Try using more lubricating jelly to prevent irritating your meatus and urethra  Make sure you drink enough liquids  Blood in the urine could also mean you have an infection  When should I contact my healthcare provider? · You have a fever  · Your urine is thick, cloudy, or has mucus in it  · You have red specks in your urine, or your urine looks pink or red  · Your urine has a strong smell  · You have pain or burning in your bladder, abdomen, or in your urethra  · You have questions or concerns about your condition or care  CARE AGREEMENT:   You have the right to help plan your care  Learn about your health condition and how it may be treated  Discuss treatment options with your caregivers to decide what care you want to receive  You always have the right to refuse treatment  The above information is an  only  It is not intended as medical advice for individual conditions or treatments  Talk to your doctor, nurse or pharmacist before following any medical regimen to see if it is safe and effective for you    © 2017 [facility] [employer] Information is for End User's use only and may not be sold, redistributed or otherwise used for commercial purposes  All illustrations and images included in CareNotes® are the copyrighted property of A D A M , Inc  or Dioni Cardenas

## 2019-02-26 NOTE — PROGRESS NOTES
2/26/2019  Loraine Narvaez is a 48 y o  female  [id number]    Diagnosis:  Chief Complaint     Neurogenic bladder; Tethered Cord; Urine Leakage; incomplete bladder emptying; Right hydronephrosis; recurrent UTI        Patient presents for urodynamics testing and cic teaching managed by Dr Guevara Situ  · Patient will follow up as scheduled to review the results of today's testing with Dr Cirilo Huang  · Patient provided with catheter samples and a copy of written instructions  · Patient will catheterize 2 x daily   · Catheter ordered submitted today to [place] [address] for size 14F  · Patient knows to call the office with any concerns, problems with supplies or difficulty passing catheter  Assessment:     Vitals:    02/26/19 1039   BP: 122/88   Pulse: 58   Weight: 66 7 kg (147 lb)   Height: 5' 2" (1 575 m)     Urinary Incontinence Screening      Most Recent Value   Urinary Incontinence   Urinary Incontinence? Yes [occurs without  urge, stress at times, max 2 pads per day]   Incomplete emptying? Yes   Urinary frequency? No   Urinary urgency? No   Urinary hesitancy? Yes   Dysuria (painful difficult urination)? No   Nocturia (waking up to use the bathroom)? No   Straining (having to push to go)? Yes   Weak stream?  No [varies]   Intermittent stream?  No   Post void dribbling? No [regularly double voids and uses crede maneuver]   Vaginal pressure? No   Vaginal dryness? No        Patient reports long history of voiding issues  She does not feel that she empties her bladder well  She reports occ stress incontinence but more often than not she has incontinence without sensory awareness  She also reports diminished sensation perianally with h/o tethered cord  She reports she routinely double voids and uses the Credé maneuver and reports often she will have a BM and void when she does this       Recent Results (from the past 1 hour(s))   POCT urine dip    Collection Time: 02/26/19 10:58 AM   Result Value Ref Range LEUKOCYTE ESTERASE,UA -     NITRITE,UA -     SL AMB POCT UROBILINOGEN -     POCT URINE PROTEIN -      PH,UA 5 0     BLOOD,UA -     SPECIFIC GRAVITY,UA 1 010     [facility] -     GLUCOSE, UA -      COLOR,UA yellow     CLARITY,UA clear      Procedure:    Urodynamics  Date/Time: 2/26/2019 1:27 PM  Performed by: Amira Zamudio RN  Authorized by: Mary Cameron MD   Procedure - Urodynamics:  Procedure details: uroflow          Comments:      Uroflow Result     Indication:  Neurogenic bladder    Procedure: [facility]   There asked to void volitionally into the uroflow apparatus      The following findings were noted:     Findings:  Voided Volume:                                   250 mL  Maximum flow (Qmax):                      35 4 ml/s  Average flow:                                      16 1 ml/s  Description of emptying curve:           irregular      Post Void Residual Measurement:   After voiding to completion the patient was brought to the exam room and positioned supine   Residual urine volume was measured via straight catheter         Bladder catheterization  Date/Time: 2/26/2019 1:32 PM  Performed by: Amira Zamudio RN  Authorized by: Mary Cameron MD     Patient location:  Bedside  Consent:     Consent obtained:  Verbal    Consent given by:  Patient  Universal protocol:     Patient identity confirmed:  Verbally with patient  Pre-procedure details:     Procedure purpose:  Diagnostic    Preparation: Patient was prepped and draped in usual sterile fashion    Anesthesia (see MAR for exact dosages): Anesthesia method:  None  Procedure details:     Bladder irrigation: no      Catheter insertion:  Non-indwelling    Approach: natural orifice      Catheter type:  Non-latex    Catheter size:  14 Fr    Number of attempts:  1    Successful placement: yes        Urine characteristics:  Clear and yellow  Post-procedure details:     Patient tolerance of procedure: Tolerated well, no immediate complications  Comments:      Post void residual: 700 mL  Urodynamics  Date/Time: 2/26/2019 1:33 PM  Performed by: Estefania Bach RN  Authorized by: Armond Suh MD   Procedure - Urodynamics:  Procedure details: CMG      Voiding Pressure Study: Yes    Intra-abdominal Voiding Pressure Study: Yes        Procedure:  Patient prepped and draped in a sterile fashion  Air charged Microtransducer vesical filling catheter placed through patient's urethra into her bladder  A second air charged Microtransducer abdominal sensor catheter placed vaginally  Two active Electrodes were then placed at the 11 o'clock and 1 o'clock positions perianally, grounding electrode placed on patient's right iliac crest  All catheters were zeroed and charged  Patient's bladder was filled with room temperature water at a rate of 50 ml/minute which remained consistent throughout testing  Findings:    Stress test completed at  241mL with cough and Valsalva  no leak produced  Max filling detrusor pressure: 16 cmH2o  Stress test at filling end with cough and Valsalva produced no leak  Of note patient filled with 750mL sterile water and pump shut off for safety  She did not have any real urge or sensation to void  She does note that she always feels like she could go "a little" but did not develop any of her usual urges during today's testing  Pt was then helped over to the commode with catheters in place and given permission to void  Voided volume: 156 mL(filled with 750mL)  Uroflow peak pressure: 23 cmH2o  Peak flow: 35 mL/s     Patient was brought back to the exam table and all catheters and electrodes were removed  She was then taught CIC  Teaching:    Reviewed female pelvic and urological anatomy with patient in depth including diagrams and a review of her personal anatomy  Also reviewed different catheter types and options with patient  She was provided with verbal and written instructions of proper CIC technique for the female patient  Since she is able to urinate but not completely empty, she was instructed to catheterize herself twice daily, ideally morning and before bed  She knows that in between if she ever gets the urge to void and is unable she may catheterize herself extra if necessary but will need to notify the office to let us know if that becomes a regular occurrence so her catheter order can be updated accordingly  Eventually, once she gets the hang of technique, she was also encouraged to keep a log of voided amt vs  Catheterized residual, a sample log was provided today  She will bring a week's worth of data to each follow up appt so we can trend her residuals over time  Lastly she was given the opportunity in the office to catheterize herself under nursing guidance and coaching  She was able to successfully catheterize herself and demonstrate proper technique prior to leaving the office  Her post void residual with CIC was 900 mL (filled with 700mL) Her questions were answered to her satisfaction and she was encouraged to call the office in the future with any concerns or further questions       VIDAL Holbrook BSN

## 2019-03-04 ENCOUNTER — TELEPHONE (OUTPATIENT)
Dept: UROLOGY | Facility: MEDICAL CENTER | Age: 54
End: 2019-03-04

## 2019-03-04 DIAGNOSIS — N31.9 NEUROGENIC BLADDER: Primary | ICD-10-CM

## 2019-03-04 DIAGNOSIS — N39.0 URINARY TRACT INFECTION WITHOUT HEMATURIA, SITE UNSPECIFIED: ICD-10-CM

## 2019-03-04 DIAGNOSIS — R82.90 ABNORMAL URINE ODOR: ICD-10-CM

## 2019-03-04 NOTE — TELEPHONE ENCOUNTER
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  H/o tethered spinal cord and neurogenic bladder  S/p urodynamic testing last week 2/26/19 with CIC teaching at that time, residuals > 700 mL noted  Called and spoke with patient  She reports starting Thursday 2/28 evening she noticed a small amt blood on the catheter after she did CIC  After that, noticed increased cloudiness to her urine with an odor  Patient denies fever, chills, and urinary burning  She reports she is doing ok with CIC still getting the hang of it and has not started to record her residuals yet but will start that this week in preparation for her FU with Dr Sophie Garibay next week  She reports she started Bactrim, which she had on hand, she is taking twice daily but has not noticed any improvement  Reviewed with patient that we will need to get a UA and culture for further assessment  Orders placed and patient will plan to go to a [employer] in [place] early tomorrow before work  She understands that because she started antibiotics already the results may not be entirely accurate  Also reviewed that urine cloudiness and odor are non specific symptoms of infection and can be related to diet, hydration and sometimes just incomplete emptying and CIC  It is appropriate, however, given recent invasive testing and learning CIC to check to make sure there is no active infection currently  In the future patient knows to always call the office regarding UTI symptoms before starting antibiotics  Will evaluate further once testing complete  Patient will call for results Friday morning if she has not heard anything by then

## 2019-03-13 ENCOUNTER — OFFICE VISIT (OUTPATIENT)
Dept: UROLOGY | Facility: AMBULATORY SURGERY CENTER | Age: 54
End: 2019-03-13
Payer: COMMERCIAL

## 2019-03-13 VITALS
HEIGHT: 62 IN | WEIGHT: 142 LBS | BODY MASS INDEX: 26.13 KG/M2 | SYSTOLIC BLOOD PRESSURE: 118 MMHG | HEART RATE: 62 BPM | DIASTOLIC BLOOD PRESSURE: 78 MMHG

## 2019-03-13 DIAGNOSIS — N31.9 NEUROGENIC BLADDER: Primary | ICD-10-CM

## 2019-03-13 LAB
BACTERIA UR QL AUTO: ABNORMAL /HPF
BILIRUB UR QL STRIP: NEGATIVE
CLARITY UR: ABNORMAL
COLOR UR: YELLOW
GLUCOSE UR STRIP-MCNC: NEGATIVE MG/DL
HGB UR QL STRIP.AUTO: NEGATIVE
HYALINE CASTS #/AREA URNS LPF: ABNORMAL /LPF
KETONES UR STRIP-MCNC: NEGATIVE MG/DL
LEUKOCYTE ESTERASE UR QL STRIP: ABNORMAL
NITRITE UR QL STRIP: POSITIVE
NON-SQ EPI CELLS URNS QL MICRO: ABNORMAL /HPF
PH UR STRIP.AUTO: 7 [PH]
PROT UR STRIP-MCNC: NEGATIVE MG/DL
RBC #/AREA URNS AUTO: ABNORMAL /HPF
SL AMB  POCT GLUCOSE, UA: NORMAL
SL AMB LEUKOCYTE ESTERASE,UA: NORMAL
SL AMB POCT BILIRUBIN,UA: NORMAL
SL AMB POCT BLOOD,UA: NORMAL
SL AMB POCT CLARITY,UA: NORMAL
SL AMB POCT COLOR,UA: YELLOW
SL AMB POCT KETONES,UA: NORMAL
SL AMB POCT NITRITE,UA: NORMAL
SL AMB POCT PH,UA: 7
SL AMB POCT SPECIFIC GRAVITY,UA: 1.01
SL AMB POCT URINE PROTEIN: NORMAL
SL AMB POCT UROBILINOGEN: 0.2
SP GR UR STRIP.AUTO: 1.02 (ref 1–1.03)
UROBILINOGEN UR QL STRIP.AUTO: 0.2 E.U./DL
WBC #/AREA URNS AUTO: ABNORMAL /HPF

## 2019-03-13 PROCEDURE — 87077 CULTURE AEROBIC IDENTIFY: CPT | Performed by: UROLOGY

## 2019-03-13 PROCEDURE — 81002 URINALYSIS NONAUTO W/O SCOPE: CPT | Performed by: UROLOGY

## 2019-03-13 PROCEDURE — 87086 URINE CULTURE/COLONY COUNT: CPT | Performed by: UROLOGY

## 2019-03-13 PROCEDURE — 87186 SC STD MICRODIL/AGAR DIL: CPT | Performed by: UROLOGY

## 2019-03-13 PROCEDURE — 81001 URINALYSIS AUTO W/SCOPE: CPT | Performed by: UROLOGY

## 2019-03-13 PROCEDURE — 99214 OFFICE O/P EST MOD 30 MIN: CPT | Performed by: UROLOGY

## 2019-03-13 NOTE — PROGRESS NOTES
3/13/2019    Tomi Heard  1965  [id number]        Assessment  History of tethered cord, neurogenic bladder      Discussion  We discussed her urodynamic evaluation  She was able to have 750 cc placed into her bladder without any rise in pressure and no sensation of filling or sensation to urinate  Her postvoid residual remains elevated  I recommend that she continue with twice daily clean intermittent catheterization and follow-up in 3 months time with a repeat retroperitoneal ultrasound  A urine culture was sent from the office today  I would hold on antibiotics unless the culture is positive  I would hold on anticholinergic medication as there was no significant rise in bladder pressure  If her ultrasound shows persistence of hydronephrosis we can readdress anticholinergics at that time  History of Present Illness  48 y o  female with a history of tethered cord in her 35s  She was found on evaluation to have an elevated postvoid residual   She recently underwent urodynamics period 750 cc were instilled into the bladder and she had virtually no sensation and no rise in pressure  She has been performing b i d  Clean intermittent catheterization  A voiding log today shows that for every 1 oz voided she usually has 2 oz remaining in her bladder  On a recent retroperitoneal ultrasound she was found to have mild right-sided hydronephrosis  Fortunately her creatinine is 0 89 from December 2018  She returns in follow-up today after urodynamics has been completed  AUA Symptom Score      Review of Systems  Review of Systems   Constitutional: Negative  HENT: Negative  Eyes: Negative  Respiratory: Negative  Cardiovascular: Negative  Gastrointestinal: Negative  Endocrine: Negative  Genitourinary:        Per HPI   Musculoskeletal: Negative  Skin: Negative  Allergic/Immunologic: Negative  Neurological: Negative  Hematological: Negative      Psychiatric/Behavioral: Negative  Past Medical History  Past Medical History:   Diagnosis Date    Benign essential hypertension 10/09/2009    Contact lens/glasses fitting     Disorder of arteries and arterioles ([facility] [address] ) 2009    Hypertension     PONV (postoperative nausea and vomiting)     Tethered spinal cord (HCC)     from the left knee 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)       Past Social History  Past Surgical History:   Procedure Laterality Date     SECTION      x3; 1998, [address], 2005    COLONOSCOPY N/A 7/10/2017    Procedure: COLONOSCOPY;  Surgeon: Yumiko Barron MD;  Location: [place] [address] GI LAB;   Service: Gastroenterology    WISDOM TOOTH EXTRACTION         Past Family History  Family History   Problem Relation Age of Onset    No Known Problems Mother     Cancer Father         liver    Heart disease Brother         hypotropic cardiomyopathy    Cardiomyopathy Brother     Sudden death Brother         cardiac (SCD)    Other Brother         idiopathic hypertrophic subarotic stenosis    Breast cancer Sister     Cancer Maternal Grandmother        Past Social history  Social History     Socioeconomic History    Marital status: /Civil Union     Spouse name: Not on file    Number of children: Not on file    Years of education: Not on file    Highest education level: Not on file   Occupational History    Not on file   Social Needs    Financial resource strain: Not on file    Food insecurity:     Worry: Not on file     Inability: Not on file    Transportation needs:     Medical: Not on file     Non-medical: Not on file   Tobacco Use    Smoking status: Former Smoker     Packs/day: 0 50     Years: 4 00     Pack years: 2 00     Types: Cigarettes     Last attempt to quit:      Years since quittin 2    Smokeless tobacco: Never Used   Substance and Sexual Activity    Alcohol use: Yes     Frequency: 2-3 times a week     Drinks per session: 1 or 2     Binge frequency: Never    Drug use: No    Sexual activity: Not Currently     Partners: Male   Lifestyle    Physical activity:     Days per week: Not on file     Minutes per session: Not on file    Stress: Not on file   Relationships    Social connections:     Talks on phone: Not on file     Gets together: Not on file     Attends Gnosticism service: Not on file     Active member of club or organization: Not on file     Attends meetings of clubs or organizations: Not on file     Relationship status: Not on file    Intimate partner violence:     Fear of current or ex partner: Not on file     Emotionally abused: Not on file     Physically abused: Not on file     Forced sexual activity: Not on file   Other Topics Concern    Not on file   Social History Narrative    Daily coffee consumption (2 cups/day)    Exercise: walking    Pets/animals: Cat    Pets/animals: Dog    Sleeps 8-10 hours a day       Current Medications  Current Outpatient Medications   Medication Sig Dispense Refill    b complex vitamins tablet Take 1 tablet by mouth every morning      Biotin 1 MG CAPS Take 1 capsule by mouth daily      BLACK COHOSH PO Take by mouth every morning      Calcium Citrate-Vitamin D (CALCIUM + D PO) Take by mouth every morning      EQL EVENING PRIMROSE OIL PO Take by mouth      Lactobacillus (ACIDOPHILUS PO) Take by mouth every morning      multivitamin (THERAGRAN) TABS Take 1 tablet by mouth every morning      NIFEdipine ER (ADALAT CC) 30 MG 24 hr tablet TAKE 1 TABLET BY MOUTH  DAILY 90 tablet 1    Omega-3 Fatty Acids (FISH OIL) 1,000 mg by Does not apply route      sertraline (ZOLOFT) 50 mg tablet Take 50 mg by mouth daily      vitamin E, tocopherol, 400 units capsule Take 400 Units by mouth every morning       No current facility-administered medications for this visit  Allergies  No Known Allergies    Past Medical History, Social History, Family History, medications and allergies were reviewed      Vitals  Vitals:    03/13/19 1119   BP: 118/78   BP Location: Left arm   Patient Position: Sitting   Cuff Size: Adult   Pulse: 62   Weight: 64 4 kg (142 lb)   Height: 5' 2" (1 575 m)       Physical Exam  Physical Exam    On examination she is in no acute distress  Gait normal   Affect normal    Results  No results found for: PSA  Lab Results   Component Value Date    GLUCOSE 86 12/20/2017    CALCIUM 9 1 12/20/2017     12/20/2017    K 4 3 12/18/2018    CO2 23 12/18/2018     12/18/2018    BUN 17 12/18/2018    CREATININE 0 83 12/20/2017     No results found for: WBC, HGB, HCT, MCV, PLT      Office Urine Dip  Recent Results (from the past 1 hour(s))   POCT urine dip    Collection Time: 03/13/19 11:20 AM   Result Value Ref Range    LEUKOCYTE ESTERASE,UA med     NITRITE,UA +     SL AMB POCT UROBILINOGEN 0 2     POCT URINE PROTEIN -      PH,UA 7 0     BLOOD,UA trace     SPECIFIC GRAVITY,UA 1 010     KETONES,UA -     BILIRUBIN,UA -     GLUCOSE, UA -      COLOR,UA yellow     CLARITY,UA cloudy    ]      Total visit time was 25 minutes of which over 50% was spent on counseling

## 2019-03-15 ENCOUNTER — TELEPHONE (OUTPATIENT)
Dept: UROLOGY | Facility: AMBULATORY SURGERY CENTER | Age: 54
End: 2019-03-15

## 2019-03-15 DIAGNOSIS — N39.0 URINARY TRACT INFECTION WITH HEMATURIA, SITE UNSPECIFIED: Primary | ICD-10-CM

## 2019-03-15 DIAGNOSIS — R31.9 URINARY TRACT INFECTION WITH HEMATURIA, SITE UNSPECIFIED: Primary | ICD-10-CM

## 2019-03-15 LAB — BACTERIA UR CULT: ABNORMAL

## 2019-03-15 RX ORDER — CEPHALEXIN 500 MG/1
500 CAPSULE ORAL EVERY 8 HOURS SCHEDULED
Qty: 15 CAPSULE | Refills: 0 | Status: SHIPPED | OUTPATIENT
Start: 2019-03-15 | End: 2019-03-20

## 2019-03-18 NOTE — TELEPHONE ENCOUNTER
Left detailed message, per communication consent, advising patient for urine culture results and Keflex prescription  Instructed patient to call office with any questions

## 2019-04-02 NOTE — TELEPHONE ENCOUNTER
Patient of Dr Digna Marrero, managed at [facility]  Recently finished course of Kelfex (last week)  Patient states that a few days ago her urine became very cloudy and has a bad odor  Patient denies any other symptoms but states that she "never gets those symptoms with a UTI"  Advised patient that I would place order for urine testing to see if infection has cleared  Patient states she will go to [facility]  Advised patient that results take about 2-3 days and office will call when they are available  Patient verbalized understanding and agrees with plan

## 2019-04-02 NOTE — TELEPHONE ENCOUNTER
Patient states she still has a uti  Patient states she finished all the medication and 2 days later infection came back

## 2019-04-12 LAB
BACTERIA UR CULT: ABNORMAL
Lab: ABNORMAL
SL AMB ANTIMICROBIAL SUSCEPTIBILITY: ABNORMAL

## 2019-04-12 RX ORDER — NITROFURANTOIN 25; 75 MG/1; MG/1
100 CAPSULE ORAL 2 TIMES DAILY
Qty: 14 CAPSULE | Refills: 0 | Status: SHIPPED | OUTPATIENT
Start: 2019-04-12 | End: 2019-04-19

## 2019-04-12 NOTE — TELEPHONE ENCOUNTER
Please inform patient results of recent urine culture showed presence of E  Coli organism  However, if patient is not symptomatic such as lower urinary tract symptoms, gross hematuria, fever >101, etc , would not recommend starting antibiotic for cloudy/odorous urine  Patient likely colonized with bacteria due to intermittent CIC  Encourage patient to increase water intake

## 2019-04-12 NOTE — TELEPHONE ENCOUNTER
If patient expressing concern, can send prescription for Macrobid to her [facility]  However, please reiterate with patient that she is likely colonized, and is at increased risk for developing resistance to antibiotics with frequently prescribing antibiotics

## 2019-04-12 NOTE — TELEPHONE ENCOUNTER
Returned patient's phone call  Advised her of the results and recommendations of provider  Patient states that she has no symptoms except the odor  Patient states that she "never has any other symptoms with infection" and "had this bacteria in my urine with infections before I ever self- cathed"  Patient concerned that she is going to have issue like she had in the past with being hospitalized if this is not treated  I advised patient that unless she is symptomatic, this is likely colonization but that I would address her concerns with the provider and someone would call her back  Patient verbalized understanding and agrees with plan

## 2019-04-12 NOTE — TELEPHONE ENCOUNTER
Left detailed message on cell phone (per communication consent) as to the above  Advised patient to call back with any further questions

## 2019-06-24 DIAGNOSIS — F32.A DEPRESSION, UNSPECIFIED DEPRESSION TYPE: Primary | ICD-10-CM

## 2019-06-24 DIAGNOSIS — I10 ESSENTIAL HYPERTENSION, MALIGNANT: ICD-10-CM

## 2019-06-24 RX ORDER — NIFEDIPINE 30 MG/1
TABLET, FILM COATED, EXTENDED RELEASE ORAL
Qty: 90 TABLET | Refills: 1 | Status: SHIPPED | OUTPATIENT
Start: 2019-06-24 | End: 2020-01-06

## 2019-06-27 ENCOUNTER — HOSPITAL ENCOUNTER (OUTPATIENT)
Dept: RADIOLOGY | Facility: HOSPITAL | Age: 54
Discharge: HOME | End: 2019-06-27
Attending: UROLOGY
Payer: COMMERCIAL

## 2019-06-27 DIAGNOSIS — N31.9 NEUROGENIC BLADDER: ICD-10-CM

## 2019-06-27 PROCEDURE — 76770 US EXAM ABDO BACK WALL COMP: CPT

## 2019-07-08 ENCOUNTER — OFFICE VISIT (OUTPATIENT)
Dept: FAMILY MEDICINE CLINIC | Facility: CLINIC | Age: 54
End: 2019-07-08
Payer: COMMERCIAL

## 2019-07-08 VITALS
HEIGHT: 62 IN | DIASTOLIC BLOOD PRESSURE: 82 MMHG | TEMPERATURE: 97.5 F | SYSTOLIC BLOOD PRESSURE: 118 MMHG | HEART RATE: 72 BPM | RESPIRATION RATE: 18 BRPM | BODY MASS INDEX: 27.49 KG/M2 | WEIGHT: 149.4 LBS

## 2019-07-08 DIAGNOSIS — I10 BENIGN ESSENTIAL HYPERTENSION: Primary | ICD-10-CM

## 2019-07-08 DIAGNOSIS — R33.9 URINARY RETENTION WITH INCOMPLETE BLADDER EMPTYING: ICD-10-CM

## 2019-07-08 DIAGNOSIS — I73.00 RAYNAUD'S SYNDROME WITHOUT GANGRENE: ICD-10-CM

## 2019-07-08 DIAGNOSIS — Z76.89 ENCOUNTER TO ESTABLISH CARE: ICD-10-CM

## 2019-07-08 PROCEDURE — 99214 OFFICE O/P EST MOD 30 MIN: CPT | Performed by: NURSE PRACTITIONER

## 2019-07-08 NOTE — PROGRESS NOTES
Assessment/Plan:    Problem List Items Addressed This Visit        Cardiovascular and Mediastinum    Benign essential hypertension - Primary    Raynaud's syndrome without gangrene      Other Visit Diagnoses     Urinary retention with incomplete bladder emptying        Encounter to establish care              BMI Counseling: Body mass index is 27 33 kg/m²  Discussed the patient's BMI with her  The BMI is above average  BMI counseling and education was provided to the patient  Nutrition recommendations include reducing portion sizes, decreasing overall calorie intake, 3-5 servings of fruits/vegetables daily and moderation in carbohydrate intake  Exercise recommendations include exercising 3-5 times per week  Patient's blood pressure is controlled  Cardiovascular risk factors include: hypertension  Current plan of care: continue current treatment regimen, dietary sodium restriction and regular aerobic exercise  Reviewed risks of hypertension and principles of treatment  Blood pressure goal <140/90  Patient clinically stable at this time  Cont with current plan of care  RTO as recommended and PRN      Patient Instructions     Chronic Hypertension   WHAT YOU NEED TO KNOW:   Hypertension is high blood pressure (BP)  Your BP is the force of your blood moving against the walls of your arteries  Normal BP is less than 120/80  Prehypertension is between 120/80 and 139/89  Hypertension is 140/90 or higher  Hypertension causes your BP to get so high that your heart has to work much harder than normal  This can damage your heart  Chronic hypertension is a long-term condition that you can control with a healthy lifestyle or medicines  A controlled blood pressure helps protect your organs, such as your heart, lungs, brain, and kidneys  DISCHARGE INSTRUCTIONS:   Call 911 for any of the following:   · You have discomfort in your chest that feels like squeezing, pressure, fullness, or pain       · You become confused or have difficulty speaking  · You suddenly feel lightheaded or have trouble breathing  · You have pain or discomfort in your back, neck, jaw, stomach, or arm  Return to the emergency department if:   · You have a severe headache or vision loss  · You have weakness in an arm or leg  Contact your healthcare provider if:   · You feel faint, dizzy, confused, or drowsy  · You have been taking your BP medicine and your BP is still higher than your healthcare provider says it should be  · You have questions or concerns about your condition or care  Medicines: You may need any of the following:  · Medicine  may be used to help lower your BP  You may need more than one type of medicine  Take the medicine exactly as directed  · Diuretics  help decrease extra fluid that collects in your body  This will help lower your BP  You may urinate more often while you take this medicine  · Cholesterol medicine  helps lower your cholesterol level  A low cholesterol level helps prevent heart disease and makes it easier to control your blood pressure  · Take your medicine as directed  Contact your healthcare provider if you think your medicine is not helping or if you have side effects  Tell him or her if you are allergic to any medicine  Keep a list of the medicines, vitamins, and herbs you take  Include the amounts, and when and why you take them  Bring the list or the pill bottles to follow-up visits  Carry your medicine list with you in case of an emergency  Follow up with your healthcare provider as directed: You will need to return to have your blood pressure checked and to have other lab tests done  Write down your questions so you remember to ask them during your visits  Manage chronic hypertension:  Talk with your healthcare provider about these and other ways to manage hypertension:  · Take your BP at home  Sit and rest for 5 minutes before you take your BP   Extend your arm and support it on a flat surface  Your arm should be at the same level as your heart  Follow the directions that came with your BP monitor  If possible, take at least 2 BP readings each time  Take your BP at least twice a day at the same times each day, such as morning and evening  Keep a record of your BP readings and bring it to your follow-up visits  Ask your healthcare provider what your blood pressure should be  · Limit sodium (salt) as directed  Too much sodium can affect your fluid balance  Check labels to find low-sodium or no-salt-added foods  Some low-sodium foods use potassium salts for flavor  Too much potassium can also cause health problems  Your healthcare provider will tell you how much sodium and potassium are safe for you to have in a day  He or she may recommend that you limit sodium to 2,300 mg a day  · Follow the meal plan recommended by your healthcare provider  A dietitian or your provider can give you more information on low-sodium plans or the DASH (Dietary Approaches to Stop Hypertension) eating plan  The DASH plan is low in sodium, unhealthy fats, and total fat  It is high in potassium, calcium, and fiber  · Exercise to maintain a healthy weight  Exercise at least 30 minutes per day, on most days of the week  This will help decrease your blood pressure  Ask about the best exercise plan for you  · Decrease stress  This may help lower your BP  Learn ways to relax, such as deep breathing or listening to music  · Limit alcohol  Women should limit alcohol to 1 drink a day  Men should limit alcohol to 2 drinks a day  A drink of alcohol is 12 ounces of beer, 5 ounces of wine, or 1½ ounces of liquor  · Do not smoke  Nicotine and other chemicals in cigarettes and cigars can increase your BP and also cause lung damage  Ask your healthcare provider for information if you currently smoke and need help to quit  E-cigarettes or smokeless tobacco still contain nicotine   Talk to your healthcare provider before you use these products  © 2017 [employer] Information is for End User's use only and may not be sold, redistributed or otherwise used for commercial purposes  All illustrations and images included in CareNotes® are the copyrighted property of A D A M [employer] Inc  or Dioni Cardenas  The above information is an  only  It is not intended as medical advice for individual conditions or treatments  Talk to your doctor, nurse or pharmacist before following any medical regimen to see if it is safe and effective for you  Return in about 6 months (around 1/8/2020) for Annual physical     Subjective:      Patient ID: Min Fisher is a 47 y o  female  Chief Complaint   Patient presents with    Follow-up     follow [place] and [place] [facility] [place] 6 month bp check    In usual state of health    Up to date with screenings (pap, mammo, colon) last full lab 12/2018    Sees Dr Tray Sanchez for urinary retention  Self caths BID  Sees him next week for f/u    Denies recurrence of rayauds sxs    No c/o today      The following portions of the patient's history were reviewed and updated as appropriate: allergies, current medications, past family history, past medical history, past social history, past surgical history and problem list     Review of Systems   Constitutional: Negative  Respiratory: Negative  Gastrointestinal: Negative  Neurological: Negative  Psychiatric/Behavioral: Negative            Current Outpatient Medications   Medication Sig Dispense Refill    b complex vitamins tablet Take 1 tablet by mouth every morning      Biotin 1 MG CAPS Take 1 capsule by mouth daily      BLACK COHOSH PO Take by mouth every morning      Calcium Citrate-Vitamin D (CALCIUM + D PO) Take by mouth every morning      EQL EVENING PRIMROSE OIL PO Take by mouth      Lactobacillus (ACIDOPHILUS PO) Take by mouth every morning      multivitamin [facility]) TABS Take 1 tablet by mouth every morning      NIFEdipine ER (ADALAT CC) 30 MG 24 hr tablet TAKE 1 TABLET BY MOUTH  DAILY 90 tablet 1    Omega-3 Fatty Acids (FISH OIL) 1,000 mg by Does not apply route      sertraline (ZOLOFT) 50 mg tablet TAKE 1 TABLET BY MOUTH  EVERY MORNING 90 tablet 2    vitamin E, tocopherol, 400 units capsule Take 400 Units by mouth every morning       No current facility-administered medications for this visit  Objective:    /82 (BP Location: Left arm, Patient Position: Sitting, Cuff Size: Standard)   Pulse 72   Temp 97 5 °F (36 4 °C)   Resp 18   Ht 5' 2" (1 575 m)   Wt 67 8 kg (149 lb 6 4 oz)   BMI 27 33 kg/m²        Physical Exam   Constitutional: She is oriented to person, place, and time  Vital signs are normal  She appears well-developed and well-nourished  No distress  HENT:   Head: Normocephalic and atraumatic  Mouth/Throat: Oropharynx is clear and moist    Eyes: Pupils are equal, round, and reactive to light  Conjunctivae, EOM and lids are normal  Lids are everted and swept, no foreign bodies found  Neck: Normal range of motion  Neck supple  Carotid bruit is not present  No thyroid mass and no thyromegaly present  Cardiovascular: Normal rate, regular rhythm, normal heart sounds, intact distal pulses and normal pulses  No murmur heard  Pulmonary/Chest: Effort normal and breath sounds normal  No respiratory distress  Abdominal: Soft  Normal appearance, normal aorta and bowel sounds are normal  There is no splenomegaly or hepatomegaly  There is no tenderness  Musculoskeletal: She exhibits no edema  Lymphadenopathy:     She has no cervical adenopathy  She has no axillary adenopathy  Neurological: She is alert and oriented to person, place, and time  She has normal strength  No sensory deficit  She exhibits normal muscle tone  Skin: Skin is warm, dry and intact  Capillary refill takes less than 2 seconds  No pallor     Psychiatric: She has a normal mood and affect  Her behavior is normal    Nursing note and vitals reviewed               L[address]

## 2019-07-08 NOTE — PATIENT INSTRUCTIONS

## 2019-07-11 ENCOUNTER — OFFICE VISIT (OUTPATIENT)
Dept: UROLOGY | Facility: AMBULATORY SURGERY CENTER | Age: 54
End: 2019-07-11
Payer: COMMERCIAL

## 2019-07-11 VITALS
BODY MASS INDEX: 27.6 KG/M2 | HEIGHT: 62 IN | SYSTOLIC BLOOD PRESSURE: 122 MMHG | WEIGHT: 150 LBS | DIASTOLIC BLOOD PRESSURE: 68 MMHG

## 2019-07-11 DIAGNOSIS — N20.0 CALCULUS OF KIDNEY: ICD-10-CM

## 2019-07-11 DIAGNOSIS — N31.9 NEUROGENIC BLADDER: Primary | ICD-10-CM

## 2019-07-11 PROCEDURE — 99213 OFFICE O/P EST LOW 20 MIN: CPT | Performed by: UROLOGY

## 2019-07-11 NOTE — PROGRESS NOTES
7/11/2019    [facility] Number  [id number]  [id number]        Assessment  Neurogenic bladder secondary to history of tethered cord/spina bifida, nephrolithiasis      Discussion  We reviewed her recent repeat ultrasound which shows no evidence of hydronephrosis  Bilateral lower pole nonobstructing 5 mm stones are noted  Follow-up in 6 months with a KUB is recommended  I recommend that she continue to perform clean intermittent catheterization at least twice daily if not 3 times daily as needed  I will also repeat a creatinine level in 6 months time  History of Present Illness  47 y o  female with a history of tethered cord/spina bifida  Her most recent creatinine from December 2018 is 0 89  In January 2019 she had an ultrasound revealing mild right-sided hydronephrosis  She returns in follow-up today  She continues to perform b i d  Clean intermittent catheterization  A recent repeat ultrasound shows resolution of the right-sided hydronephrosis  Bilateral lower pole nonobstructing 5 mm stones are noted  She denies any prior history of nephrolithiasis  She denies any flank pain or hematuria  AUA Symptom Score      Review of Systems  Review of Systems   Constitutional: Negative  HENT: Negative  Eyes: Negative  Respiratory: Negative  Cardiovascular: Negative  Gastrointestinal: Negative  Endocrine: Negative  Genitourinary: Negative  Musculoskeletal: Negative  Skin: Negative  Allergic/Immunologic: Negative  Neurological: Negative  Hematological: Negative  Psychiatric/Behavioral: Negative            Past Medical History  Past Medical History:   Diagnosis Date    Benign essential hypertension 10/09/2009    Contact lens/glasses fitting     Disorder of arteries and arterioles ([facility] [address] ) 05/12/2009    Hypertension     PONV (postoperative nausea and vomiting)     Tethered spinal cord (HCC)     from the left knee 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)       Past Social History  Past Surgical History:   Procedure Laterality Date     SECTION      x3; [address], 2005    COLONOSCOPY N/A 7/10/2017    Procedure: COLONOSCOPY;  Surgeon: Mike Diaz MD;  Location: [place] [address] GI LAB;   Service: Gastroenterology    WISDOM TOOTH EXTRACTION         Past Family History  Family History   Problem Relation Age of Onset    No Known Problems Mother     Cancer Father         liver    Heart disease Brother         hypotropic cardiomyopathy    Cardiomyopathy Brother     Sudden death Brother         cardiac (SCD)    Other Brother         idiopathic hypertrophic subarotic stenosis    Breast cancer Sister     Cancer Maternal Grandmother        Past Social history  Social History     Socioeconomic History    Marital status: /Civil Union     Spouse name: Not on file    Number of children: Not on file    Years of education: Not on file    Highest education level: Not on file   Occupational History    Not on file   Social Needs    Financial resource strain: Not on file    Food insecurity:     Worry: Not on file     Inability: Not on file    Transportation needs:     Medical: Not on file     Non-medical: Not on file   Tobacco Use    Smoking status: Former Smoker     Packs/day: 0 50     Years: 4 00     Pack years: 2 00     Types: Cigarettes     Last attempt to quit:      Years since quittin 5    Smokeless tobacco: Never Used   Substance and Sexual Activity    Alcohol use: Yes     Frequency: 2-3 times a week     Drinks per session: 1 or 2     Binge frequency: Never    Drug use: No    Sexual activity: Not Currently     Partners: Male   Lifestyle    Physical activity:     Days per week: Not on file     Minutes per session: Not on file    Stress: Not on file   Relationships    Social connections:     Talks on phone: Not on file     Gets together: Not on file     Attends Christianity service: Not on file     Active member of club or organization: Not on file Attends meetings of clubs or organizations: Not on file     Relationship status: Not on file    Intimate partner violence:     Fear of current or ex partner: Not on file     Emotionally abused: Not on file     Physically abused: Not on file     Forced sexual activity: Not on file   Other Topics Concern    Not on file   Social History Narrative    Daily coffee consumption (2 cups/day)    Exercise: walking    Pets/animals: Cat    Pets/animals: Dog    Sleeps 8-10 hours a day       Current Medications  Current Outpatient Medications   Medication Sig Dispense Refill    b complex vitamins tablet Take 1 tablet by mouth every morning      Biotin 1 MG CAPS Take 1 capsule by mouth daily      BLACK COHOSH PO Take by mouth every morning      Calcium Citrate-Vitamin D (CALCIUM + D PO) Take by mouth every morning      EQL EVENING PRIMROSE OIL PO Take by mouth      Lactobacillus (ACIDOPHILUS PO) Take by mouth every morning      multivitamin (THERAGRAN) TABS Take 1 tablet by mouth every morning      NIFEdipine ER (ADALAT CC) 30 MG 24 hr tablet TAKE 1 TABLET BY MOUTH  DAILY 90 tablet 1    Omega-3 Fatty Acids (FISH OIL) 1,000 mg by Does not apply route      sertraline (ZOLOFT) 50 mg tablet TAKE 1 TABLET BY MOUTH  EVERY MORNING 90 tablet 2    vitamin E, tocopherol, 400 units capsule Take 400 Units by mouth every morning       No current facility-administered medications for this visit  Allergies  No Known Allergies    Past Medical History, Social History, Family History, medications and allergies were reviewed  Vitals  Vitals:    07/11/19 1449   BP: 122/68   BP Location: Right arm   Patient Position: Sitting   Cuff Size: Adult   Weight: 68 kg (150 lb)   Height: 5' 2" (1 575 m)       Physical Exam  Physical Exam    On examination she is in no acute distress  Her abdomen is soft nontender nondistended   examination reveals no CVA tenderness  The bladder is nonpalpable  Skin is warm    Extremities without edema    Neurologic is grossly intact and nonfocal   Gait normal   Affect normal    Results  No results found for: PSA  Lab Results   Component Value Date    GLUCOSE 86 12/20/2017    CALCIUM 9 1 12/20/2017     12/20/2017    K 4 3 12/18/2018    CO2 23 12/18/2018     12/18/2018    BUN 17 12/18/2018    CREATININE 0 89 12/18/2018     No results found for: WBC, HGB, HCT, MCV, PLT      Office Urine Dip  No results found for this or any previous visit (from the past 1 hour(s)) ]

## 2019-07-22 ENCOUNTER — OFFICE VISIT (OUTPATIENT)
Dept: FAMILY MEDICINE CLINIC | Facility: CLINIC | Age: 54
End: 2019-07-22
Payer: COMMERCIAL

## 2019-07-22 VITALS
DIASTOLIC BLOOD PRESSURE: 70 MMHG | SYSTOLIC BLOOD PRESSURE: 110 MMHG | HEART RATE: 72 BPM | BODY MASS INDEX: 27.82 KG/M2 | HEIGHT: 62 IN | RESPIRATION RATE: 14 BRPM | WEIGHT: 151.2 LBS | TEMPERATURE: 99 F

## 2019-07-22 DIAGNOSIS — L03.115 CELLULITIS OF RIGHT THIGH: ICD-10-CM

## 2019-07-22 DIAGNOSIS — L03.317 CELLULITIS OF LEFT BUTTOCK: Primary | ICD-10-CM

## 2019-07-22 PROCEDURE — 99213 OFFICE O/P EST LOW 20 MIN: CPT | Performed by: NURSE PRACTITIONER

## 2019-07-22 RX ORDER — DOXYCYCLINE HYCLATE 100 MG/1
100 CAPSULE ORAL EVERY 12 HOURS SCHEDULED
Qty: 20 CAPSULE | Refills: 0 | Status: SHIPPED | OUTPATIENT
Start: 2019-07-22 | End: 2019-08-01

## 2019-07-22 NOTE — PROGRESS NOTES
Assessment:      Cellulitis of left buttock, right thigh  Plan:   The case discussed with patient using patient centered shared decision making  The patient was counseled regarding instructions for management,-- risk factor reductions,-- prognosis,-- impressions,-- risks and benefits of treatment options,-- importance of compliance with treatment  I have reviewed the instructions with the patient, answering all questions to her satisfaction  doxycycline and silvadene prescribed  Educational material distributed  Ibuprofen for pain with moderate relief  Follow-up in 1 week for wound check  call with update on wed and sooner if sxs progress per discussion     Subjective: Candia Denver is a 47 y o  female who presents for evaluation of a possible skin infection located on left buttock, right thigh  Onset of symptoms was  1 day ago, with gradually worsening course since that time  Symptoms include moderate pain and erythema located left buttock and right posterior thigh  Patient denies chills and fever greater than 100  She reports working in garden in bathing suit (weeding) all day on Saturday  Perceived few insect bites but never visualized insects  Treatment to date has included neosporin with minimal relief  No recent travel  Has only gone swimming in her pool  The following portions of the patient's history were reviewed and updated as appropriate: allergies, current medications, past family history, past medical history, past social history, past surgical history and problem list     Review of Systems  Pertinent items are noted in HPI        Objective:      /70 (BP Location: Left arm, Patient Position: Sitting, Cuff Size: Large)   Pulse 72   Temp 99 °F (37 2 °C)   Resp 14   Ht 5' 2" (1 575 m)   Wt 68 6 kg (151 lb 3 2 oz)   BMI 27 65 kg/m²   General appearance: alert and oriented, in no acute distress  Skin: erythema - buttock(s) left, upper leg(s) right  Lymph nodes: Inguinal adenopathy: +

## 2019-07-22 NOTE — PATIENT INSTRUCTIONS
Cellulitis   WHAT YOU NEED TO KNOW:   Cellulitis is a skin infection caused by bacteria  Cellulitis may go away on its own or you may need treatment  Your healthcare provider may draw a Samish around the outside edges of your cellulitis  If your cellulitis spreads, your healthcare provider will see it outside of the Samish  DISCHARGE INSTRUCTIONS:   Call 911 if:   · You have sudden trouble breathing or chest pain  Return to the emergency department if:   · Your wound gets larger and more painful  · You feel a crackling under your skin when you touch it  · You have purple dots or bumps on your skin, or you see bleeding under your skin  · You have new swelling and pain in your legs  · The red, warm, swollen area gets larger  · You see red streaks coming from the infected area  Contact your healthcare provider if:   · You have a fever  · Your fever or pain does not go away or gets worse  · The area does not get smaller after 2 days of antibiotics  · Your skin is flaking or peeling off  · You have questions or concerns about your condition or care  Medicines:   · Antibiotics  help treat the bacterial infection  · NSAIDs , such as ibuprofen, help decrease swelling, pain, and fever  NSAIDs can cause stomach bleeding or kidney problems in certain people  If you take blood thinner medicine, always ask if NSAIDs are safe for you  Always read the medicine label and follow directions  Do not give these medicines to children under 10months of age without direction from your child's healthcare provider  · Acetaminophen  decreases pain and fever  It is available without a doctor's order  Ask how much to take and how often to take it  Follow directions  Read the labels of all other medicines you are using to see if they also contain acetaminophen, or ask your doctor or pharmacist  Acetaminophen can cause liver damage if not taken correctly   Do not use more than 4 grams (4,000 milligrams) total of acetaminophen in one day  · Take your medicine as directed  Contact your healthcare provider if you think your medicine is not helping or if you have side effects  Tell him or her if you are allergic to any medicine  Keep a list of the medicines, vitamins, and herbs you take  Include the amounts, and when and why you take them  Bring the list or the pill bottles to follow-up visits  Carry your medicine list with you in case of an emergency  Self-care:   · Elevate the area above the level of your heart  as often as you can  This will help decrease swelling and pain  Prop the area on pillows or blankets to keep it elevated comfortably  · Clean the area daily until the wound scabs over  Gently wash the area with soap and water  Pat dry  Use dressings as directed  · Place cool or warm, wet cloths on the area as directed  Use clean cloths and clean water  Leave it on the area until the cloth is room temperature  Pat the area dry with a clean, dry cloth  The cloths may help decrease pain  Prevent cellulitis:   · Do not scratch bug bites or areas of injury  You increase your risk for cellulitis by scratching these areas  · Do not share personal items, such as towels, clothing, and razors  · Clean exercise equipment  with germ-killing  before and after you use it  · Wash your hands often  Use soap and water  Wash your hands after you use the bathroom, change a child's diapers, or sneeze  Wash your hands before you prepare or eat food  Use lotion to prevent dry, cracked skin  · Wear pressure stockings as directed  You may be told to wear the stockings if you have peripheral edema  The stockings improve blood flow and decrease swelling  · Treat athlete's foot  This can help prevent the spread of a bacterial skin infection  Follow up with your healthcare provider within 3 days, or as directed:   Your healthcare provider will check if your cellulitis is getting better  You may need different medicine  Write down your questions so you remember to ask them during your visits  © 2017 [employer] Information is for End User's use only and may not be sold, redistributed or otherwise used for commercial purposes  All illustrations and images included in CareNotes® are the copyrighted property of A D A M [employer] Inc  or D[employer] Cardenas  The above information is an  only  It is not intended as medical advice for individual conditions or treatments  Talk to your doctor, nurse or pharmacist before following any medical regimen to see if it is safe and effective for you

## 2019-07-30 ENCOUNTER — OFFICE VISIT (OUTPATIENT)
Dept: FAMILY MEDICINE CLINIC | Facility: CLINIC | Age: 54
End: 2019-07-30
Payer: COMMERCIAL

## 2019-07-30 VITALS
WEIGHT: 151 LBS | TEMPERATURE: 97.8 F | BODY MASS INDEX: 27.79 KG/M2 | RESPIRATION RATE: 18 BRPM | HEIGHT: 62 IN | HEART RATE: 62 BPM | DIASTOLIC BLOOD PRESSURE: 70 MMHG | SYSTOLIC BLOOD PRESSURE: 132 MMHG
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
## 2019-07-30 DIAGNOSIS — L03.317 CELLULITIS OF LEFT BUTTOCK: Primary | ICD-10-CM

## 2019-07-30 DIAGNOSIS — I10 BENIGN ESSENTIAL HYPERTENSION: ICD-10-CM

## 2019-07-30 PROCEDURE — 3078F DIAST BP <80 MM HG: CPT | Performed by: NURSE PRACTITIONER

## 2019-07-30 PROCEDURE — 3008F BODY MASS INDEX DOCD: CPT | Performed by: NURSE PRACTITIONER

## 2019-07-30 PROCEDURE — 99213 OFFICE O/P EST LOW 20 MIN: CPT | Performed by: NURSE PRACTITIONER

## 2019-07-30 PROCEDURE — 1036F TOBACCO NON-USER: CPT | Performed by: NURSE PRACTITIONER

## 2019-07-30 PROCEDURE — 36415 COLL VENOUS BLD VENIPUNCTURE: CPT | Performed by: NURSE PRACTITIONER

## 2019-07-30 PROCEDURE — 3075F SYST BP GE 130 - 139MM HG: CPT | Performed by: NURSE PRACTITIONER

## 2019-07-30 RX ORDER — DOXYCYCLINE HYCLATE 100 MG
100 TABLET ORAL 2 TIMES DAILY
Qty: 8 TABLET | Refills: 0 | Status: SHIPPED | OUTPATIENT
Start: 2019-07-30 | End: 2019-08-03

## 2019-07-30 NOTE — PROGRESS NOTES
Assessment:      Cellulitis of left buttock, right thigh  Plan:   The case discussed with patient using patient centered shared decision making  The patient was counseled regarding instructions for management,-- risk factor reductions,-- prognosis,-- impressions,-- risks and benefits of treatment options,-- importance of compliance with treatment  I have reviewed the instructions with the patient, answering all questions to her satisfaction  Cont doxycycline and silvadene   Consider Wound center referral  Pt declines at this time  Ibuprofen for pain with moderate relief  Labs drawn today (lyme, cbc, cmp)  Follow-up in 1 week for wound check  call with update on frid and sooner if sxs progress per discussion     Subjective: Janay Navarro is a 47 y o  female who presents for recheck of skin infection located on left buttock, right thigh  Onset of symptoms was  1 week ago, with gradually improving course since that time  Initial Symptoms include moderate pain and erythema located left buttock and right posterior thigh  At present right thigh erythema largely resolved  Affected skin pink, dry at present  Left buttock still slightly erythematous with central eschar  Patient denies chills and fever greater than 100  She reports working in garden in bathing suit (weeding) all day on Saturday  Perceived few insect bites but never visualized insects  The following portions of the patient's history were reviewed and updated as appropriate: allergies, current medications, past family history, past medical history, past social history, past surgical history and problem list     Review of Systems  Pertinent items are noted in HPI        Objective:      /92 (BP Location: Left arm, Patient Position: Sitting, Cuff Size: Standard)   Pulse 62   Temp 97 8 °F (36 6 °C)   Resp 18   Ht 5' 2" (1 575 m)   Wt 68 5 kg (151 lb)   BMI 27 62 kg/m²   General appearance: alert and oriented, in no acute distress  Skin: erythematous area measuring 4 inches in diameter with central eschar--No tenderness, drainage, fluctuance   buttock(s) left, upper leg(s) right  Lymph nodes:   Inguinal adenopathy: none

## 2019-08-02 LAB
ALBUMIN SERPL-MCNC: 4.2 G/DL (ref 3.5–5.5)
ALBUMIN/GLOB SERPL: 1.7 {RATIO} (ref 1.2–2.2)
ALP SERPL-CCNC: 72 IU/L (ref 39–117)
ALT SERPL-CCNC: 21 IU/L (ref 0–32)
AST SERPL-CCNC: 28 IU/L (ref 0–40)
B BURGDOR IGG PATRN SER IB-IMP: NEGATIVE
B BURGDOR IGG+IGM SER-ACNC: 1.36 ISR (ref 0–0.9)
B BURGDOR IGM PATRN SER IB-IMP: NEGATIVE
B BURGDOR IGM SER IA-ACNC: 1.56 INDEX (ref 0–0.79)
B BURGDOR18KD IGG SER QL IB: PRESENT
B BURGDOR23KD IGG SER QL IB: ABNORMAL
B BURGDOR23KD IGM SER QL IB: PRESENT
B BURGDOR28KD IGG SER QL IB: ABNORMAL
B BURGDOR30KD IGG SER QL IB: ABNORMAL
B BURGDOR39KD IGG SER QL IB: ABNORMAL
B BURGDOR39KD IGM SER QL IB: ABNORMAL
B BURGDOR41KD IGG SER QL IB: PRESENT
B BURGDOR41KD IGM SER QL IB: ABNORMAL
B BURGDOR45KD IGG SER QL IB: ABNORMAL
B BURGDOR58KD IGG SER QL IB: ABNORMAL
B BURGDOR66KD IGG SER QL IB: ABNORMAL
B BURGDOR93KD IGG SER QL IB: ABNORMAL
BILIRUB SERPL-MCNC: <0.2 MG/DL (ref 0–1.2)
BUN SERPL-MCNC: 25 MG/DL (ref 6–24)
BUN/CREAT SERPL: 34 (ref 9–23)
CALCIUM SERPL-MCNC: 9.4 MG/DL (ref 8.7–10.2)
CHLORIDE SERPL-SCNC: 103 MMOL/L (ref 96–106)
CO2 SERPL-SCNC: 20 MMOL/L (ref 20–29)
CREAT SERPL-MCNC: 0.73 MG/DL (ref 0.57–1)
ERYTHROCYTE [DISTWIDTH] IN BLOOD BY AUTOMATED COUNT: 12.7 % (ref 12.3–15.4)
GLOBULIN SER-MCNC: 2.5 G/DL (ref 1.5–4.5)
GLUCOSE SERPL-MCNC: 83 MG/DL (ref 65–99)
HCT VFR BLD AUTO: 36.6 % (ref 34–46.6)
HGB BLD-MCNC: 11.9 G/DL (ref 11.1–15.9)
MCH RBC QN AUTO: 28.1 PG (ref 26.6–33)
MCHC RBC AUTO-ENTMCNC: 32.5 G/DL (ref 31.5–35.7)
MCV RBC AUTO: 87 FL (ref 79–97)
PLATELET # BLD AUTO: 360 X10E3/UL (ref 150–450)
POTASSIUM SERPL-SCNC: 4.5 MMOL/L (ref 3.5–5.2)
PROT SERPL-MCNC: 6.7 G/DL (ref 6–8.5)
RBC # BLD AUTO: 4.23 X10E6/UL (ref 3.77–5.28)
SL AMB EGFR AFRICAN AMERICAN: 108 ML/MIN/1.73
SL AMB EGFR NON AFRICAN AMERICAN: 94 ML/MIN/1.73
SODIUM SERPL-SCNC: 141 MMOL/L (ref 134–144)
WBC # BLD AUTO: 10.2 X10E3/UL (ref 3.4–10.8)

## 2019-08-05 ENCOUNTER — TELEPHONE (OUTPATIENT)
Dept: FAMILY MEDICINE CLINIC | Facility: CLINIC | Age: 54
End: 2019-08-05

## 2019-08-05 NOTE — TELEPHONE ENCOUNTER
Please advise that labs negative for new lyme infection, there is evidence of old exposure  How is the rash/infection?

## 2019-10-14 ENCOUNTER — OFFICE VISIT (OUTPATIENT)
Dept: FAMILY MEDICINE CLINIC | Facility: CLINIC | Age: 54
End: 2019-10-14
Payer: COMMERCIAL

## 2019-10-14 VITALS
RESPIRATION RATE: 14 BRPM | DIASTOLIC BLOOD PRESSURE: 70 MMHG | WEIGHT: 150 LBS | SYSTOLIC BLOOD PRESSURE: 110 MMHG | HEART RATE: 72 BPM | BODY MASS INDEX: 27.6 KG/M2 | TEMPERATURE: 100.6 F | HEIGHT: 62 IN

## 2019-10-14 DIAGNOSIS — J06.9 UPPER RESPIRATORY INFECTION, ACUTE: Primary | ICD-10-CM

## 2019-10-14 PROCEDURE — 99213 OFFICE O/P EST LOW 20 MIN: CPT | Performed by: FAMILY MEDICINE

## 2019-10-14 PROCEDURE — 3008F BODY MASS INDEX DOCD: CPT | Performed by: FAMILY MEDICINE

## 2019-10-14 RX ORDER — AZITHROMYCIN 250 MG/1
TABLET, FILM COATED ORAL
Qty: 6 TABLET | Refills: 0 | Status: SHIPPED | OUTPATIENT
Start: 2019-10-14 | End: 2019-10-18

## 2019-10-14 NOTE — PROGRESS NOTES
Assessment/Plan:       Diagnoses and all orders for this visit:    Upper respiratory infection, acute  -     azithromycin (ZITHROMAX) 250 mg tablet; Take 2 tablets today then 1 tablet daily x 4 days    Supportive care with fluids, rest, and tylenol  Will give zpak precautionary if no improvement in fevers and worsening symptoms  Side effects reviewed  Precautions reviewed  Patient agrees with plan  No allergies  Subjective:      Patient ID: Lila Quevedo is a 47 y o  female  HPI  46 y/o female presents for an acute visit  Patient states she has been feeling sick since Thursday Saturday was the worse day where she has sputum unable to bring up, nasal congestion  Can't taste anything  Fever since Saturday in the 100's  Has taken tylenol cold and flu day and night version with no relief  No appetite  Associated with headaches  No sick contacts  No recent travel  The following portions of the patient's history were reviewed and updated as appropriate: allergies, current medications, past family history, past medical history, past social history, past surgical history and problem list     Review of Systems   Constitutional: Positive for appetite change, fatigue and fever  Negative for chills  HENT: Positive for congestion and sore throat  Negative for ear discharge, ear pain, rhinorrhea, sinus pressure, sinus pain and trouble swallowing  Eyes: Negative for visual disturbance  Respiratory: Positive for cough  Negative for shortness of breath  Cardiovascular: Negative for chest pain and leg swelling  Gastrointestinal: Negative for abdominal pain, diarrhea, nausea and vomiting  Genitourinary: Negative for dysuria  Musculoskeletal: Negative for arthralgias  Skin: Negative for color change  Neurological: Positive for headaches  Negative for dizziness, tremors and light-headedness  Psychiatric/Behavioral: Negative for agitation and behavioral problems           Objective:      /70 (BP Location: Left arm, Patient Position: Sitting, Cuff Size: Standard)   Pulse 72   Temp (!) 100 6 °F (38 1 °C) (Tympanic)   Resp 14   Ht 5' 2" (1 575 m)   Wt 68 kg (150 lb)   BMI 27 44 kg/m²          Physical Exam   Constitutional: She is oriented to person, place, and time  She appears well-developed and well-nourished  Non-toxic appearance  No distress  HENT:   Head: Normocephalic and atraumatic  Right Ear: Tympanic membrane and external ear normal  No drainage or swelling  Left Ear: Tympanic membrane and external ear normal  No drainage or swelling  Nose: Nose normal    Mouth/Throat: Oropharynx is clear and moist  No oropharyngeal exudate  Eyes: EOM are normal  Right eye exhibits no discharge  Left eye exhibits no discharge  Neck: Normal range of motion  Neck supple  Cardiovascular: Normal rate, regular rhythm, normal heart sounds and intact distal pulses  No murmur heard  Pulmonary/Chest: Effort normal and breath sounds normal  No respiratory distress  Abdominal: Soft  Bowel sounds are normal  She exhibits no distension  There is no tenderness  Musculoskeletal: Normal range of motion  She exhibits no edema  Neurological: She is alert and oriented to person, place, and time  Skin: Skin is warm  Psychiatric: She has a normal mood and affect   Her behavior is normal

## 2019-10-15 ENCOUNTER — TELEPHONE (OUTPATIENT)
Dept: FAMILY MEDICINE CLINIC | Facility: CLINIC | Age: 54
End: 2019-10-15

## 2019-10-15 NOTE — TELEPHONE ENCOUNTER
Dr Emily Moran pt still is not feeling any better and would like a note to return to work 10/17  Also pt is coughing a lot could she have a script sent to pharmacy   Pt would like note fax to her job at [phone]

## 2019-10-16 DIAGNOSIS — J06.9 UPPER RESPIRATORY TRACT INFECTION, UNSPECIFIED TYPE: Primary | ICD-10-CM

## 2019-10-16 RX ORDER — DEXTROMETHORPHAN HYDROBROMIDE AND PROMETHAZINE HYDROCHLORIDE 15; 6.25 MG/5ML; MG/5ML
5 SOLUTION ORAL 3 TIMES DAILY PRN
Qty: 118 ML | Refills: 0 | Status: SHIPPED | OUTPATIENT
Start: 2019-10-16

## 2019-10-16 NOTE — TELEPHONE ENCOUNTER
Patient is still very congested, coughing, she just started cough med that was called in  She is requesting new note for work keeping her out all week 10/14- 10/18, returning to work 10/21/19  Please fax to # previously provided

## 2019-11-25 ENCOUNTER — TELEPHONE (OUTPATIENT)
Dept: FAMILY MEDICINE CLINIC | Facility: CLINIC | Age: 54
End: 2019-11-25

## 2019-11-25 DIAGNOSIS — Z12.31 BREAST CANCER SCREENING BY MAMMOGRAM: Primary | ICD-10-CM

## 2019-12-10 DIAGNOSIS — Z12.31 BREAST CANCER SCREENING BY MAMMOGRAM: ICD-10-CM

## 2019-12-30 ENCOUNTER — OFFICE VISIT (OUTPATIENT)
Dept: FAMILY MEDICINE CLINIC | Facility: CLINIC | Age: 54
End: 2019-12-30
Payer: COMMERCIAL

## 2019-12-30 VITALS
WEIGHT: 148 LBS | BODY MASS INDEX: 27.23 KG/M2 | RESPIRATION RATE: 16 BRPM | DIASTOLIC BLOOD PRESSURE: 86 MMHG | SYSTOLIC BLOOD PRESSURE: 122 MMHG | HEIGHT: 62 IN | TEMPERATURE: 97.9 F | HEART RATE: 68 BPM

## 2019-12-30 DIAGNOSIS — Z13.220 LIPID SCREENING: ICD-10-CM

## 2019-12-30 DIAGNOSIS — Z13.0 SCREENING, DEFICIENCY ANEMIA, IRON: ICD-10-CM

## 2019-12-30 DIAGNOSIS — Z13.29 SCREENING FOR THYROID DISORDER: ICD-10-CM

## 2019-12-30 DIAGNOSIS — Z13.1 SCREENING FOR DIABETES MELLITUS: ICD-10-CM

## 2019-12-30 DIAGNOSIS — I10 ESSENTIAL HYPERTENSION, MALIGNANT: ICD-10-CM

## 2019-12-30 DIAGNOSIS — Z00.00 HEALTH CARE MAINTENANCE: Primary | ICD-10-CM

## 2019-12-30 PROCEDURE — 36415 COLL VENOUS BLD VENIPUNCTURE: CPT | Performed by: NURSE PRACTITIONER

## 2019-12-30 PROCEDURE — 99396 PREV VISIT EST AGE 40-64: CPT | Performed by: NURSE PRACTITIONER

## 2019-12-30 NOTE — PROGRESS NOTES
FAMILY PRACTICE HEALTH MAINTENANCE OFFICE VISIT  [facility]    NAME: Krystal Olson  AGE: 47 y o  SEX: female  : 1965     DATE: 2019    Assessment and Plan     1  Health care maintenance  -     TSH, 3rd generation with Free T4 reflex; Future  -     CBC and Platelet; Future  -     Lipid panel; Future  -     Basic metabolic panel; Future    2  Essential hypertension, malignant  Comments:  BP at goal no med changes  Orders:  -     TSH, 3rd generation with Free T4 reflex; Future  -     CBC and Platelet; Future  -     Basic metabolic panel; Future    3  Screening for diabetes mellitus  -     Basic metabolic panel; Future    4  Screening for thyroid disorder  -     TSH, 3rd generation with Free T4 reflex; Future    5  Screening, deficiency anemia, iron  -     CBC and Platelet; Future    6  Lipid screening  -     Lipid panel; Future        · Patient Counseling:   · Nutrition: Stressed importance of a well balanced diet, moderation of sodium/saturated fat, caloric balance and sufficient intake of fiber  · Exercise: Stressed the importance of regular exercise with a goal of 150 minutes per week  · Dental Health: Discussed daily flossing and brushing and regular dental visits   · Injury Prevention: Discussed Safety Belts, Safety Helmets, and Smoke Detectors    · Immunizations reviewed: Up To Date  · Discussed benefits of:  Screening labs and next mammo 2020, next colon 2022, next pap 2020   BMI Counseling: Body mass index is 27 07 kg/m²  Discussed with patient's BMI with her  The BMI is above normal  Nutrition recommendations include reducing portion sizes, decreasing overall calorie intake, 3-5 servings of fruits/vegetables daily, reducing fast food intake and moderation in carbohydrate intake  Exercise recommendations include exercising 3-5 times per week  Return in about 6 months (around 2020)          Chief Complaint     Chief Complaint   Patient presents with    Physical Exam     annual PE        History of Present Illness     HPI    Well Adult Physical   Patient here for a comprehensive physical exam       Diet and Physical Activity  Diet: well balanced diet  Exercise: several times per week      Depression Screen  PHQ-9 Depression Screening    PHQ-9:    Frequency of the following problems over the past two weeks:       Little interest or pleasure in doing things:  0 - not at all  Feeling down, depressed, or hopeless:  0 - not at all  PHQ-2 Score:  0          General Health  Hearing: Normal:  bilateral  Vision: goes for regular eye exams and wears glasses  Dental: regular dental visits    Reproductive Health  No issues  and Post-menopausal       The following portions of the patient's history were reviewed and updated as appropriate: allergies, current medications, past family history, past medical history, past social history, past surgical history and problem list     Review of Systems     Review of Systems   Constitutional: Negative  Respiratory: Negative  Cardiovascular: Negative  Gastrointestinal: Negative for blood in stool  Endocrine: Negative  Neurological: Negative  All other systems reviewed and are negative  Past Medical History     Past Medical History:   Diagnosis Date    Benign essential hypertension 10/09/2009    Contact lens/glasses fitting     Disorder of arteries and arterioles ([address] ) 2009    Hypertension     PONV (postoperative nausea and vomiting)     Tethered spinal cord (HCC)     from the left knee 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)       Past Surgical History     Past Surgical History:   Procedure Laterality Date     SECTION      x3; 1998, [address], 2005    COLONOSCOPY N/A 7/10/2017    Procedure: COLONOSCOPY;  Surgeon: Blayne Taylor MD;  [facility]: [facility];   Service: Gastroenterology    WISDOM TOOTH EXTRACTION         Social History     Social History     Socioeconomic History  Marital status: /Civil Union     Spouse name: None    Number of children: None    Years of education: None    Highest education level: None   Occupational History    None   Social Needs    Financial resource strain: None    Food insecurity:     Worry: None     Inability: None    Transportation needs:     Medical: None     Non-medical: None   Tobacco Use    Smoking status: Former Smoker     Packs/day: 0 50     Years: 4 00     Pack years: 2 00     Types: Cigarettes     Last attempt to quit:      Years since quittin 0    Smokeless tobacco: Never Used   Substance and Sexual Activity    Alcohol use: Yes     Frequency: 2-3 times a week     Drinks per session: 1 or 2     Binge frequency: Never    Drug use: No    Sexual activity: Not Currently     Partners: Male   Lifestyle    Physical activity:     Days per week: None     Minutes per session: None    Stress: None   Relationships    Social connections:     Talks on phone: None     Gets together: None     Attends [employer] service: None     Active member of club or organization: None     Attends meetings of clubs or organizations: None     Relationship status: None    Intimate partner violence:     Fear of current or ex partner: None     Emotionally abused: None     Physically abused: None     Forced sexual activity: None   Other Topics Concern    None   Social History Narrative    Daily coffee consumption (2 cups/day)    Exercise: walking    Pets/animals: Cat    Pets/animals: Dog    Sleeps 8-10 hours a day       Family History     Family History   Problem Relation Age of Onset    No Known Problems Mother     Cancer Father         liver    Heart disease Brother         hypotropic cardiomyopathy    Cardiomyopathy Brother     Sudden death Brother         cardiac (SCD)    Other Brother         idiopathic hypertrophic subarotic stenosis    Breast cancer Sister     Cancer Maternal Grandmother        Current Medications       Current Outpatient Medications:     b complex vitamins tablet, Take 1 tablet by mouth every morning, Disp: , Rfl:     Biotin 1 MG CAPS, Take 1 capsule by mouth daily, Disp: , Rfl:     BLACK COHOSH PO, Take by mouth every morning, Disp: , Rfl:     Calcium Citrate-Vitamin D (CALCIUM + D PO), Take by mouth every morning, Disp: , Rfl:     EQL EVENING PRIMROSE OIL PO, Take by mouth, Disp: , Rfl:     Lactobacillus (ACIDOPHILUS PO), Take by mouth every morning, Disp: , Rfl:     multivitamin (THERAGRAN) TABS, Take 1 tablet by mouth every morning, Disp: , Rfl:     NIFEdipine ER (ADALAT CC) 30 MG 24 hr tablet, TAKE 1 TABLET BY MOUTH  DAILY, Disp: 90 tablet, Rfl: 1    Omega-3 Fatty Acids (FISH OIL) 1,000 mg, by Does not apply route, Disp: , Rfl:     sertraline (ZOLOFT) 50 mg tablet, TAKE 1 TABLET BY MOUTH  EVERY MORNING, Disp: 90 tablet, Rfl: 2    vitamin E, tocopherol, 400 units capsule, Take 400 Units by mouth every morning, Disp: , Rfl:     Promethazine-DM (PHENERGAN-DM) 6 25-15 mg/5 mL oral syrup, Take 5 mL by mouth 3 (three) times a day as needed for cough (Patient not taking: Reported on 12/30/2019), Disp: 118 mL, Rfl: 0    silver sulfadiazine (SILVADENE,SSD) 1 % cream, Apply topically daily (Patient not taking: Reported on 12/30/2019), Disp: 50 g, Rfl: 0     Allergies     No Known Allergies    Objective     /86 (BP Location: Left arm, Patient Position: Sitting, Cuff Size: Standard)   Pulse 68   Temp 97 9 °F (36 6 °C)   Resp 16   Ht 5' 2" (1 575 m)   Wt 67 1 kg (148 lb)   BMI 27 07 kg/m²      Physical Exam   Constitutional: She is oriented to person, place, and time  Vital signs are normal  She appears well-developed and well-nourished  No distress  HENT:   Head: Normocephalic and atraumatic  Right Ear: External ear normal    Left Ear: External ear normal    Nose: Nose normal    Mouth/Throat: Oropharynx is clear and moist    Eyes: Pupils are equal, round, and reactive to light   Conjunctivae, EOM and lids are normal  Lids are everted and swept, no foreign bodies found  Neck: Normal range of motion  Neck supple  Carotid bruit is not present  No thyroid mass and no thyromegaly present  Cardiovascular: Normal rate, regular rhythm, normal heart sounds, intact distal pulses and normal pulses  No murmur heard  Pulmonary/Chest: Effort normal and breath sounds normal  No respiratory distress  Abdominal: Soft  Normal appearance, normal aorta and bowel sounds are normal  There is no splenomegaly or hepatomegaly  There is no tenderness  Musculoskeletal: She exhibits no edema  Lymphadenopathy:     She has no cervical adenopathy  She has no axillary adenopathy  Neurological: She is alert and oriented to person, place, and time  She has normal strength  No cranial nerve deficit or sensory deficit  She exhibits normal muscle tone  Coordination normal    Skin: Skin is warm, dry and intact  Capillary refill takes less than 2 seconds  No pallor  Psychiatric: She has a normal mood and affect  Her behavior is normal    Nursing note and vitals reviewed          No exam data present        Bony Pinon, [address]

## 2019-12-31 LAB
BASOPHILS # BLD AUTO: 0 X10E3/UL (ref 0–0.2)
BASOPHILS NFR BLD AUTO: 0 %
BUN SERPL-MCNC: 21 MG/DL (ref 6–24)
BUN/CREAT SERPL: 25 (ref 9–23)
CALCIUM SERPL-MCNC: 9.1 MG/DL (ref 8.7–10.2)
CHLORIDE SERPL-SCNC: 103 MMOL/L (ref 96–106)
CHOLEST SERPL-MCNC: 200 MG/DL (ref 100–199)
CO2 SERPL-SCNC: 24 MMOL/L (ref 20–29)
CREAT SERPL-MCNC: 0.84 MG/DL (ref 0.57–1)
EOSINOPHIL # BLD AUTO: 0.2 X10E3/UL (ref 0–0.4)
EOSINOPHIL NFR BLD AUTO: 2 %
ERYTHROCYTE [DISTWIDTH] IN BLOOD BY AUTOMATED COUNT: 13.3 % (ref 12.3–15.4)
GLUCOSE SERPL-MCNC: 90 MG/DL (ref 65–99)
HCT VFR BLD AUTO: 37.8 % (ref 34–46.6)
HDLC SERPL-MCNC: 75 MG/DL
HGB BLD-MCNC: 12.5 G/DL (ref 11.1–15.9)
IMM GRANULOCYTES # BLD: 0 X10E3/UL (ref 0–0.1)
IMM GRANULOCYTES NFR BLD: 0 %
LDLC SERPL CALC-MCNC: 112 MG/DL (ref 0–99)
LYMPHOCYTES # BLD AUTO: 2.6 X10E3/UL (ref 0.7–3.1)
LYMPHOCYTES NFR BLD AUTO: 36 %
MCH RBC QN AUTO: 27.8 PG (ref 26.6–33)
MCHC RBC AUTO-ENTMCNC: 33.1 G/DL (ref 31.5–35.7)
MCV RBC AUTO: 84 FL (ref 79–97)
MICRODELETION SYND BLD/T FISH: NORMAL
MONOCYTES # BLD AUTO: 0.4 X10E3/UL (ref 0.1–0.9)
MONOCYTES NFR BLD AUTO: 6 %
NEUTROPHILS # BLD AUTO: 3.8 X10E3/UL (ref 1.4–7)
NEUTROPHILS NFR BLD AUTO: 56 %
PLATELET # BLD AUTO: 290 X10E3/UL (ref 150–450)
POTASSIUM SERPL-SCNC: 4.5 MMOL/L (ref 3.5–5.2)
RBC # BLD AUTO: 4.5 X10E6/UL (ref 3.77–5.28)
SL AMB EGFR AFRICAN AMERICAN: 91 ML/MIN/1.73
SL AMB EGFR NON AFRICAN AMERICAN: 79 ML/MIN/1.73
SL AMB VLDL CHOLESTEROL CALC: 13 MG/DL (ref 5–40)
SODIUM SERPL-SCNC: 140 MMOL/L (ref 134–144)
TRIGL SERPL-MCNC: 67 MG/DL (ref 0–149)
TSH SERPL DL<=0.005 MIU/L-ACNC: 1.92 UIU/ML (ref 0.45–4.5)
WBC # BLD AUTO: 7 X10E3/UL (ref 3.4–10.8)

## 2020-01-02 ENCOUNTER — TELEPHONE (OUTPATIENT)
Dept: FAMILY MEDICINE CLINIC | Facility: CLINIC | Age: 55
End: 2020-01-02

## 2020-01-02 NOTE — TELEPHONE ENCOUNTER
----- Message from Karina Trivedi, [address] sent at 1/2/2020  8:51 AM EST -----  Please advise patient that labs are acceptable  Cont plan of care and follow up as recommended

## 2020-01-06 DIAGNOSIS — I10 ESSENTIAL HYPERTENSION, MALIGNANT: ICD-10-CM

## 2020-01-06 RX ORDER — NIFEDIPINE 30 MG/1
TABLET, FILM COATED, EXTENDED RELEASE ORAL
Qty: 90 TABLET | Refills: 0 | Status: SHIPPED | OUTPATIENT
Start: 2020-01-06 | End: 2020-03-16

## 2020-01-22 ENCOUNTER — TELEPHONE (OUTPATIENT)
Dept: UROLOGY | Facility: MEDICAL CENTER | Age: 55
End: 2020-01-22

## 2020-01-22 NOTE — TELEPHONE ENCOUNTER
Patient Dr Maria Del Rosario Mayfield seen in the [facility] office  Patient would like to know why she is needed an xray this time instead of an Ultrasound  Please advise

## 2020-01-22 NOTE — TELEPHONE ENCOUNTER
Called Lin back and explained per Dr Gaitan Snare last note she is to get KUB - order is placed and she just walks in to any [facility]    She just had a BMP completed on 12/30 so I told her I think that would be good for her appointment,

## 2020-01-29 ENCOUNTER — TRANSCRIBE ORDERS (OUTPATIENT)
Dept: ADMINISTRATIVE | Facility: HOSPITAL | Age: 55
End: 2020-01-29

## 2020-01-29 ENCOUNTER — HOSPITAL ENCOUNTER (OUTPATIENT)
Dept: RADIOLOGY | Facility: HOSPITAL | Age: 55
Discharge: HOME | End: 2020-01-29
Attending: UROLOGY
Payer: COMMERCIAL

## 2020-01-29 DIAGNOSIS — N20.0 CALCULUS OF KIDNEY: ICD-10-CM

## 2020-01-29 PROCEDURE — 74018 RADEX ABDOMEN 1 VIEW: CPT

## 2020-02-04 ENCOUNTER — TELEPHONE (OUTPATIENT)
Dept: UROLOGY | Facility: AMBULATORY SURGERY CENTER | Age: 55
End: 2020-02-04

## 2020-02-04 ENCOUNTER — OFFICE VISIT (OUTPATIENT)
Dept: UROLOGY | Facility: AMBULATORY SURGERY CENTER | Age: 55
End: 2020-02-04
Payer: COMMERCIAL

## 2020-02-04 VITALS
DIASTOLIC BLOOD PRESSURE: 72 MMHG | WEIGHT: 148 LBS | HEART RATE: 62 BPM | SYSTOLIC BLOOD PRESSURE: 122 MMHG | HEIGHT: 62 IN | BODY MASS INDEX: 27.23 KG/M2

## 2020-02-04 DIAGNOSIS — N20.0 NEPHROLITHIASIS: Primary | ICD-10-CM

## 2020-02-04 PROCEDURE — 3078F DIAST BP <80 MM HG: CPT | Performed by: NURSE PRACTITIONER

## 2020-02-04 PROCEDURE — 99213 OFFICE O/P EST LOW 20 MIN: CPT | Performed by: NURSE PRACTITIONER

## 2020-02-04 PROCEDURE — 1036F TOBACCO NON-USER: CPT | Performed by: NURSE PRACTITIONER

## 2020-02-04 PROCEDURE — 3074F SYST BP LT 130 MM HG: CPT | Performed by: NURSE PRACTITIONER

## 2020-02-04 PROCEDURE — 3008F BODY MASS INDEX DOCD: CPT | Performed by: NURSE PRACTITIONER

## 2020-02-04 NOTE — PROGRESS NOTES
2/4/2020      Chief Complaint   Patient presents with    Nephrolithiasis    Neurogenic bladder     Assessment and Plan    47 y o  female managed by Dr La Montero    1  Neurogenic bladder  · Continue CIC BID with 14 F, straight tipped catheter  · Ultrasound of kidney and bladder as below    2  Nephrolithiasis  · KUB performed 01/29/2020 his negative for calculi  · Repeat KUB and ultrasound in 1 year  · Behavior in dietary modifications discussed to decrease future stone formation    History of Present Illness  Lorraine Villa is a 47 y o  female here for follow up evaluation of  neurogenic bladder and nephrolithiasis  A prior office visit patient started to initiate CIC b i d  With 15 Nepali straight tip catheter for which she has been able to successfully performed without difficulty  She denies difficulty/pain with catheterizations  Patient reports she is able to empty her bladder well on her own but continues to CIC and is comfortable performing/procedure  She does have a history of nephrolithiasis  Most recent KUB performed 01/29/2020 is negative for calculi  She denies suprapubic abdominal pain and flank pain  Patient denies dysuria, hematuria and fever and chills  She does report occasional sensation of incomplete bladder emptying  Review of Systems   Constitutional: Negative for chills and fever  Respiratory: Negative for cough and shortness of breath  Cardiovascular: Negative for chest pain  Gastrointestinal: Negative for abdominal distention, abdominal pain, blood in stool, nausea and vomiting  Genitourinary: Negative for difficulty urinating, dysuria, enuresis, flank pain, frequency, hematuria and urgency  Musculoskeletal: Negative for back pain  Skin: Negative for rash  Neurological: Negative for dizziness       Past Medical History  Past Medical History:   Diagnosis Date    Benign essential hypertension 10/09/2009    Contact lens/glasses fitting     Disorder of arteries and arterioles ([facility] [address] ) 2009    Hypertension     PONV (postoperative nausea and vomiting)     Tethered spinal cord (HCC)     from the left knee 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)       Past Social History  Past Surgical History:   Procedure Laterality Date     SECTION      x3; , ,     COLONOSCOPY N/A 7/10/2017    Procedure: COLONOSCOPY;  Surgeon: Jayla Bradley MD;  Location: [facility];   Service: Gastroenterology    WISDOM TOOTH EXTRACTION       Social History     Tobacco Use   Smoking Status Former Smoker    Packs/day: 0 50    Years: 4 00    Pack years: 2 00    Types: Cigarettes    Last attempt to quit: Rhenda Kt Years since quittin 1   Smokeless Tobacco Never Used       Past Family History  Family History   Problem Relation Age of Onset    No Known Problems Mother     Cancer Father         liver    Heart disease Brother         hypotropic cardiomyopathy    Cardiomyopathy Brother     Sudden death Brother         cardiac (SCD)    Other Brother         idiopathic hypertrophic subarotic stenosis    Breast cancer Sister     Cancer Maternal Grandmother        Past Social history  Social History     Socioeconomic History    Marital status: /Civil Union     Spouse name: Not on file    Number of children: Not on file    Years of education: Not on file    Highest education level: Not on file   Occupational History    Not on file   Social Needs    Financial resource strain: Not on file    Food insecurity:     Worry: Not on file     Inability: Not on file    Transportation needs:     Medical: Not on file     Non-medical: Not on file   Tobacco Use    Smoking status: Former Smoker     Packs/day: 0 50     Years: 4 00     Pack years: 2 00     Types: Cigarettes     Last attempt to quit:      Years since quittin 1    Smokeless tobacco: Never Used   Substance and Sexual Activity    Alcohol use: Yes     Frequency: 2-3 times a week     Drinks per session: 1 or 2     Binge frequency: Never    Drug use: No    Sexual activity: Not Currently     Partners: Male   Lifestyle    Physical activity:     Days per week: Not on file     Minutes per session: Not on file    Stress: Not on file   Relationships    Social connections:     Talks on phone: Not on file     Gets together: Not on file     Attends Zoroastrianism service: Not on file     Active member of club or organization: Not on file     Attends meetings of clubs or organizations: Not on file     Relationship status: Not on file    Intimate partner violence:     Fear of current or ex partner: Not on file     Emotionally abused: Not on file     Physically abused: Not on file     Forced sexual activity: Not on file   Other Topics Concern    Not on file   Social History Narrative    Daily coffee consumption (2 cups/day)    Exercise: walking    Pets/animals: Cat    Pets/animals: Dog    Sleeps 8-10 hours a day       Current Medications  Current Outpatient Medications   Medication Sig Dispense Refill    NIFEdipine ER (ADALAT CC) 30 MG 24 hr tablet TAKE 1 TABLET BY MOUTH  DAILY 90 tablet 0    vitamin E, tocopherol, 400 units capsule Take 400 Units by mouth every morning      b complex vitamins tablet Take 1 tablet by mouth every morning      Biotin 1 MG CAPS Take 1 capsule by mouth daily      BLACK COHOSH PO Take by mouth every morning      Calcium Citrate-Vitamin D (CALCIUM + D PO) Take by mouth every morning      EQL EVENING PRIMROSE OIL PO Take by mouth      Lactobacillus (ACIDOPHILUS PO) Take by mouth every morning      multivitamin (THERAGRAN) TABS Take 1 tablet by mouth every morning      Omega-3 Fatty Acids (FISH OIL) 1,000 mg by Does not apply route      Promethazine-DM (PHENERGAN-DM) 6 25-15 mg/5 mL oral syrup Take 5 mL by mouth 3 (three) times a day as needed for cough (Patient not taking: Reported on 12/30/2019) 118 mL 0    sertraline (ZOLOFT) 50 mg tablet TAKE 1 TABLET BY MOUTH  EVERY MORNING 90 tablet 2    silver sulfadiazine (SILVADENE,SSD) 1 % cream Apply topically daily (Patient not taking: Reported on 12/30/2019) 50 g 0     No current facility-administered medications for this visit  Allergies  No Known Allergies      The following portions of the patient's history were reviewed and updated as appropriate: allergies, current medications, past medical history, past social history, past surgical history and problem list       Vitals  Vitals:    02/04/20 0949   BP: 122/72   BP Location: Left arm   Patient Position: Sitting   Cuff Size: Adult   Pulse: 62   Weight: 67 1 kg (148 lb)   Height: 5' 2" (1 575 m)     Physical Exam  Physical Exam   Constitutional: She is oriented to person, place, and time  She appears well-developed and well-nourished  HENT:   Head: Atraumatic  Cardiovascular: Normal rate  Pulmonary/Chest: Effort normal and breath sounds normal    Abdominal: Soft  Musculoskeletal: Normal range of motion  Neurological: She is alert and oriented to person, place, and time  Skin: Skin is warm and dry  Psychiatric: She has a normal mood and affect  Vitals reviewed  Results  No results found for this or any previous visit (from the past 1 hour(s))  ]  No results found for: PSA  Lab Results   Component Value Date    GLUCOSE 86 12/20/2017    CALCIUM 9 1 12/20/2017     12/20/2017    K 4 5 12/30/2019    CO2 24 12/30/2019     12/30/2019    BUN 21 12/30/2019    CREATININE 0 84 12/30/2019     Lab Results   Component Value Date    WBC 7 0 12/30/2019    HGB 12 5 12/30/2019    HCT 37 8 12/30/2019    MCV 84 12/30/2019     12/30/2019     Orders  Orders Placed This Encounter   Procedures    US kidney and bladder     Standing Status:   Future     Standing Expiration Date:   2/4/2024     Scheduling Instructions:      "Prep required if being scheduled in conjunction with other studies, refer to those examination's Preps first before scheduling               All patients for US Kidney and Bladder they must drink 24 oz of water 60 minutes before your scheduled appointment time  This test requires you to have a FULL bladder  Please do not urinate before your test             Please bring your physician order, insurance cards, a form of photo ID and a list of your medications with you  Arrive 15 minutes prior to your appointment time in order to register  If you need to have lab work or a urinalysis, please do this AFTER your ultrasound  "     Order Specific Question:   Reason for Exam:     Answer:   nephrolithiasis     Order Specific Question:   Is the patient pregnant? Answer:   No    XR abdomen 1 view kub     Nephrolithiasis     Standing Status:   Future     Standing Expiration Date:   2/4/2024     Scheduling Instructions:      Bring along any outside films relating to this procedure  Order Specific Question:   Is the patient pregnant?      Answer:   No       OVIDIO Schwartz

## 2020-02-04 NOTE — TELEPHONE ENCOUNTER
----- Message from Giana Browne 79  sent at 2/4/2020  2:12 PM EST -----  Pt unsure if supplies need to be re-ordered for CIC  Thank you

## 2020-02-04 NOTE — PATIENT INSTRUCTIONS
Continue with self catheter 2 times a day  Call the office for concerns or questions  KUB and US to be performed in 1 year  Continue to maintain hydration - upwards to 60 oz per day  Avoid/limit te intake of coffee, soda, tea, strawberries, broccoli, spinach and kale

## 2020-02-04 NOTE — TELEPHONE ENCOUNTER
Call placed to patient, advised that he order from last year states length of need as "lifetime"  Patient should be able to reach out to [employer] for her supplies  Advised her that the company will reach out to us if anything further is needed  Patient verbalized understanding

## 2020-03-15 DIAGNOSIS — I10 ESSENTIAL HYPERTENSION, MALIGNANT: ICD-10-CM

## 2020-03-15 DIAGNOSIS — F32.A DEPRESSION, UNSPECIFIED DEPRESSION TYPE: ICD-10-CM

## 2020-03-16 RX ORDER — NIFEDIPINE 30 MG/1
TABLET, FILM COATED, EXTENDED RELEASE ORAL
Qty: 90 TABLET | Refills: 1 | Status: SHIPPED | OUTPATIENT
Start: 2020-03-16 | End: 2020-09-29

## 2020-04-03 ENCOUNTER — TELEMEDICINE (OUTPATIENT)
Dept: FAMILY MEDICINE CLINIC | Facility: CLINIC | Age: 55
End: 2020-04-03
Payer: COMMERCIAL

## 2020-04-03 VITALS — BODY MASS INDEX: 26.68 KG/M2 | TEMPERATURE: 100.1 F | WEIGHT: 145 LBS | HEIGHT: 62 IN

## 2020-04-03 DIAGNOSIS — R50.9 FEBRILE ILLNESS, ACUTE: ICD-10-CM

## 2020-04-03 DIAGNOSIS — Z20.828 EXPOSURE TO SARS-ASSOCIATED CORONAVIRUS: Primary | ICD-10-CM

## 2020-04-03 PROCEDURE — 3008F BODY MASS INDEX DOCD: CPT | Performed by: NURSE PRACTITIONER

## 2020-04-03 PROCEDURE — 99213 OFFICE O/P EST LOW 20 MIN: CPT | Performed by: NURSE PRACTITIONER

## 2020-04-06 ENCOUNTER — TELEMEDICINE (OUTPATIENT)
Dept: FAMILY MEDICINE CLINIC | Facility: CLINIC | Age: 55
End: 2020-04-06
Payer: COMMERCIAL

## 2020-04-06 VITALS — TEMPERATURE: 98.9 F

## 2020-04-06 DIAGNOSIS — Z20.822 SUSPECTED COVID-19 VIRUS INFECTION: Primary | ICD-10-CM

## 2020-04-06 PROCEDURE — 99213 OFFICE O/P EST LOW 20 MIN: CPT | Performed by: NURSE PRACTITIONER

## 2020-09-29 DIAGNOSIS — I10 ESSENTIAL HYPERTENSION, MALIGNANT: ICD-10-CM

## 2020-09-29 DIAGNOSIS — F32.A DEPRESSION, UNSPECIFIED DEPRESSION TYPE: ICD-10-CM

## 2020-09-29 RX ORDER — NIFEDIPINE 30 MG/1
TABLET, FILM COATED, EXTENDED RELEASE ORAL
Qty: 90 TABLET | Refills: 3 | Status: SHIPPED | OUTPATIENT
Start: 2020-09-29 | End: 2022-01-03 | Stop reason: SDUPTHER

## 2020-11-09 ENCOUNTER — TELEPHONE (OUTPATIENT)
Dept: FAMILY MEDICINE CLINIC | Facility: CLINIC | Age: 55
End: 2020-11-09

## 2020-11-09 DIAGNOSIS — Z13.220 LIPID SCREENING: ICD-10-CM

## 2020-11-09 DIAGNOSIS — I10 BENIGN ESSENTIAL HYPERTENSION: Primary | ICD-10-CM

## 2020-11-09 DIAGNOSIS — Z13.0 SCREENING, DEFICIENCY ANEMIA, IRON: ICD-10-CM

## 2020-11-09 DIAGNOSIS — Z13.29 SCREENING FOR THYROID DISORDER: ICD-10-CM

## 2020-11-09 DIAGNOSIS — Z13.1 SCREENING FOR DIABETES MELLITUS: ICD-10-CM

## 2020-11-09 DIAGNOSIS — Z12.31 ENCOUNTER FOR SCREENING MAMMOGRAM FOR MALIGNANT NEOPLASM OF BREAST: ICD-10-CM

## 2020-11-12 ENCOUNTER — OFFICE VISIT (OUTPATIENT)
Dept: CARDIOLOGY CLINIC | Facility: CLINIC | Age: 55
End: 2020-11-12
Payer: COMMERCIAL

## 2020-11-12 VITALS
WEIGHT: 156 LBS | HEART RATE: 68 BPM | DIASTOLIC BLOOD PRESSURE: 74 MMHG | BODY MASS INDEX: 28.71 KG/M2 | OXYGEN SATURATION: 99 % | SYSTOLIC BLOOD PRESSURE: 118 MMHG | TEMPERATURE: 100 F | HEIGHT: 62 IN

## 2020-11-12 DIAGNOSIS — I10 BENIGN ESSENTIAL HYPERTENSION: ICD-10-CM

## 2020-11-12 DIAGNOSIS — I42.2 PRIMARY FAMILIAL HYPERTROPHIC CARDIOMYOPATHY (HCC): Primary | ICD-10-CM

## 2020-11-12 PROCEDURE — 93000 ELECTROCARDIOGRAM COMPLETE: CPT | Performed by: INTERNAL MEDICINE

## 2020-11-12 PROCEDURE — 3074F SYST BP LT 130 MM HG: CPT | Performed by: INTERNAL MEDICINE

## 2020-11-12 PROCEDURE — 1036F TOBACCO NON-USER: CPT | Performed by: INTERNAL MEDICINE

## 2020-11-12 PROCEDURE — 3008F BODY MASS INDEX DOCD: CPT | Performed by: INTERNAL MEDICINE

## 2020-11-12 PROCEDURE — 3078F DIAST BP <80 MM HG: CPT | Performed by: INTERNAL MEDICINE

## 2020-11-12 PROCEDURE — 99214 OFFICE O/P EST MOD 30 MIN: CPT | Performed by: INTERNAL MEDICINE

## 2020-12-16 LAB
BASOPHILS # BLD AUTO: 0.1 X10E3/UL (ref 0–0.2)
BASOPHILS NFR BLD AUTO: 1 %
BUN SERPL-MCNC: 23 MG/DL (ref 6–24)
BUN/CREAT SERPL: 24 (ref 9–23)
CALCIUM SERPL-MCNC: 9.5 MG/DL (ref 8.7–10.2)
CHLORIDE SERPL-SCNC: 104 MMOL/L (ref 96–106)
CHOLEST SERPL-MCNC: 204 MG/DL (ref 100–199)
CO2 SERPL-SCNC: 26 MMOL/L (ref 20–29)
CREAT SERPL-MCNC: 0.96 MG/DL (ref 0.57–1)
EOSINOPHIL # BLD AUTO: 0.2 X10E3/UL (ref 0–0.4)
EOSINOPHIL NFR BLD AUTO: 3 %
ERYTHROCYTE [DISTWIDTH] IN BLOOD BY AUTOMATED COUNT: 11.6 % (ref 11.7–15.4)
GLUCOSE SERPL-MCNC: 96 MG/DL (ref 65–99)
HCT VFR BLD AUTO: 37.4 % (ref 34–46.6)
HDLC SERPL-MCNC: 76 MG/DL
HGB BLD-MCNC: 12.5 G/DL (ref 11.1–15.9)
IMM GRANULOCYTES # BLD: 0 X10E3/UL (ref 0–0.1)
IMM GRANULOCYTES NFR BLD: 0 %
LDLC SERPL CALC-MCNC: 120 MG/DL (ref 0–99)
LYMPHOCYTES # BLD AUTO: 2.5 X10E3/UL (ref 0.7–3.1)
LYMPHOCYTES NFR BLD AUTO: 36 %
MCH RBC QN AUTO: 28.5 PG (ref 26.6–33)
MCHC RBC AUTO-ENTMCNC: 33.4 G/DL (ref 31.5–35.7)
MCV RBC AUTO: 85 FL (ref 79–97)
MICRODELETION SYND BLD/T FISH: NORMAL
MONOCYTES # BLD AUTO: 0.4 X10E3/UL (ref 0.1–0.9)
MONOCYTES NFR BLD AUTO: 6 %
NEUTROPHILS # BLD AUTO: 3.7 X10E3/UL (ref 1.4–7)
NEUTROPHILS NFR BLD AUTO: 54 %
PLATELET # BLD AUTO: 291 X10E3/UL (ref 150–450)
POTASSIUM SERPL-SCNC: 4.3 MMOL/L (ref 3.5–5.2)
RBC # BLD AUTO: 4.38 X10E6/UL (ref 3.77–5.28)
SL AMB EGFR AFRICAN AMERICAN: 77 ML/MIN/1.73
SL AMB EGFR NON AFRICAN AMERICAN: 67 ML/MIN/1.73
SL AMB VLDL CHOLESTEROL CALC: 8 MG/DL (ref 5–40)
SODIUM SERPL-SCNC: 142 MMOL/L (ref 134–144)
TRIGL SERPL-MCNC: 45 MG/DL (ref 0–149)
WBC # BLD AUTO: 6.8 X10E3/UL (ref 3.4–10.8)

## 2020-12-18 DIAGNOSIS — Z12.31 ENCOUNTER FOR SCREENING MAMMOGRAM FOR MALIGNANT NEOPLASM OF BREAST: ICD-10-CM

## 2020-12-21 ENCOUNTER — TELEPHONE (OUTPATIENT)
Dept: UROLOGY | Facility: AMBULATORY SURGERY CENTER | Age: 55
End: 2020-12-21

## 2020-12-31 ENCOUNTER — OFFICE VISIT (OUTPATIENT)
Dept: FAMILY MEDICINE CLINIC | Facility: CLINIC | Age: 55
End: 2020-12-31
Payer: COMMERCIAL

## 2020-12-31 VITALS
BODY MASS INDEX: 28.34 KG/M2 | HEART RATE: 73 BPM | TEMPERATURE: 97.8 F | SYSTOLIC BLOOD PRESSURE: 130 MMHG | WEIGHT: 154 LBS | DIASTOLIC BLOOD PRESSURE: 76 MMHG | HEIGHT: 62 IN | OXYGEN SATURATION: 98 % | RESPIRATION RATE: 18 BRPM

## 2020-12-31 DIAGNOSIS — Z00.00 HEALTH CARE MAINTENANCE: Primary | ICD-10-CM

## 2020-12-31 DIAGNOSIS — Z71.2 ENCOUNTER TO DISCUSS TEST RESULTS: ICD-10-CM

## 2020-12-31 PROCEDURE — 3008F BODY MASS INDEX DOCD: CPT | Performed by: NURSE PRACTITIONER

## 2020-12-31 PROCEDURE — 99396 PREV VISIT EST AGE 40-64: CPT | Performed by: NURSE PRACTITIONER

## 2020-12-31 PROCEDURE — 3078F DIAST BP <80 MM HG: CPT | Performed by: NURSE PRACTITIONER

## 2020-12-31 PROCEDURE — 3725F SCREEN DEPRESSION PERFORMED: CPT | Performed by: NURSE PRACTITIONER

## 2020-12-31 PROCEDURE — 3075F SYST BP GE 130 - 139MM HG: CPT | Performed by: NURSE PRACTITIONER

## 2020-12-31 PROCEDURE — 1036F TOBACCO NON-USER: CPT | Performed by: NURSE PRACTITIONER

## 2020-12-31 RX ORDER — OMEGA-3S/DHA/EPA/FISH OIL/D3 300MG-1000
400 CAPSULE ORAL DAILY
COMMUNITY

## 2021-02-04 ENCOUNTER — HOSPITAL ENCOUNTER (OUTPATIENT)
Dept: RADIOLOGY | Facility: HOSPITAL | Age: 56
Discharge: HOME | End: 2021-02-04
Payer: COMMERCIAL

## 2021-02-04 DIAGNOSIS — N20.0 NEPHROLITHIASIS: ICD-10-CM

## 2021-02-04 PROCEDURE — 76770 US EXAM ABDO BACK WALL COMP: CPT

## 2021-05-17 LAB
LEFT EYE DIABETIC RETINOPATHY: NORMAL
RIGHT EYE DIABETIC RETINOPATHY: NORMAL

## 2021-11-12 ENCOUNTER — TELEPHONE (OUTPATIENT)
Dept: FAMILY MEDICINE CLINIC | Facility: CLINIC | Age: 56
End: 2021-11-12

## 2021-11-12 DIAGNOSIS — Z12.31 ENCOUNTER FOR SCREENING MAMMOGRAM FOR MALIGNANT NEOPLASM OF BREAST: Primary | ICD-10-CM

## 2021-12-01 ENCOUNTER — TELEPHONE (OUTPATIENT)
Dept: FAMILY MEDICINE CLINIC | Facility: CLINIC | Age: 56
End: 2021-12-01

## 2021-12-01 DIAGNOSIS — Z13.1 SCREENING FOR DIABETES MELLITUS: ICD-10-CM

## 2021-12-01 DIAGNOSIS — Z13.29 SCREENING FOR THYROID DISORDER: ICD-10-CM

## 2021-12-01 DIAGNOSIS — Z13.0 SCREENING, DEFICIENCY ANEMIA, IRON: ICD-10-CM

## 2021-12-01 DIAGNOSIS — Z13.220 LIPID SCREENING: ICD-10-CM

## 2021-12-01 DIAGNOSIS — I10 BENIGN ESSENTIAL HYPERTENSION: Primary | ICD-10-CM

## 2021-12-03 ENCOUNTER — OFFICE VISIT (OUTPATIENT)
Dept: UROLOGY | Facility: CLINIC | Age: 56
End: 2021-12-03
Payer: COMMERCIAL

## 2021-12-03 VITALS
SYSTOLIC BLOOD PRESSURE: 144 MMHG | WEIGHT: 152 LBS | DIASTOLIC BLOOD PRESSURE: 88 MMHG | BODY MASS INDEX: 27.97 KG/M2 | HEIGHT: 62 IN

## 2021-12-03 DIAGNOSIS — N20.0 NEPHROLITHIASIS: Primary | ICD-10-CM

## 2021-12-03 PROCEDURE — 99213 OFFICE O/P EST LOW 20 MIN: CPT | Performed by: PHYSICIAN ASSISTANT

## 2021-12-03 PROCEDURE — 1036F TOBACCO NON-USER: CPT | Performed by: PHYSICIAN ASSISTANT

## 2021-12-30 ENCOUNTER — OFFICE VISIT (OUTPATIENT)
Dept: FAMILY MEDICINE CLINIC | Facility: CLINIC | Age: 56
End: 2021-12-30
Payer: COMMERCIAL

## 2021-12-30 VITALS
HEART RATE: 60 BPM | HEIGHT: 62 IN | WEIGHT: 143.8 LBS | OXYGEN SATURATION: 99 % | BODY MASS INDEX: 26.46 KG/M2 | TEMPERATURE: 98.3 F | SYSTOLIC BLOOD PRESSURE: 114 MMHG | DIASTOLIC BLOOD PRESSURE: 80 MMHG | RESPIRATION RATE: 16 BRPM

## 2021-12-30 DIAGNOSIS — Z00.00 HEALTH CARE MAINTENANCE: Primary | ICD-10-CM

## 2021-12-30 DIAGNOSIS — I10 BENIGN ESSENTIAL HYPERTENSION: ICD-10-CM

## 2021-12-30 PROCEDURE — 99396 PREV VISIT EST AGE 40-64: CPT | Performed by: NURSE PRACTITIONER

## 2021-12-30 PROCEDURE — 3008F BODY MASS INDEX DOCD: CPT | Performed by: PHYSICIAN ASSISTANT

## 2021-12-31 LAB
BASOPHILS # BLD AUTO: 0.1 X10E3/UL (ref 0–0.2)
BASOPHILS NFR BLD AUTO: 1 %
BUN SERPL-MCNC: 20 MG/DL (ref 6–24)
BUN/CREAT SERPL: 27 (ref 9–23)
CALCIUM SERPL-MCNC: 9.7 MG/DL (ref 8.7–10.2)
CHLORIDE SERPL-SCNC: 103 MMOL/L (ref 96–106)
CHOLEST SERPL-MCNC: 204 MG/DL (ref 100–199)
CO2 SERPL-SCNC: 25 MMOL/L (ref 20–29)
CREAT SERPL-MCNC: 0.75 MG/DL (ref 0.57–1)
EOSINOPHIL # BLD AUTO: 0.1 X10E3/UL (ref 0–0.4)
EOSINOPHIL NFR BLD AUTO: 2 %
ERYTHROCYTE [DISTWIDTH] IN BLOOD BY AUTOMATED COUNT: 11.7 % (ref 11.7–15.4)
GLUCOSE SERPL-MCNC: 91 MG/DL (ref 65–99)
HCT VFR BLD AUTO: 38.4 % (ref 34–46.6)
HDLC SERPL-MCNC: 77 MG/DL
HGB BLD-MCNC: 13.3 G/DL (ref 11.1–15.9)
IMM GRANULOCYTES # BLD: 0 X10E3/UL (ref 0–0.1)
IMM GRANULOCYTES NFR BLD: 0 %
LDLC SERPL CALC-MCNC: 116 MG/DL (ref 0–99)
LYMPHOCYTES # BLD AUTO: 2.7 X10E3/UL (ref 0.7–3.1)
LYMPHOCYTES NFR BLD AUTO: 43 %
MCH RBC QN AUTO: 29.4 PG (ref 26.6–33)
MCHC RBC AUTO-ENTMCNC: 34.6 G/DL (ref 31.5–35.7)
MCV RBC AUTO: 85 FL (ref 79–97)
MICRODELETION SYND BLD/T FISH: NORMAL
MONOCYTES # BLD AUTO: 0.4 X10E3/UL (ref 0.1–0.9)
MONOCYTES NFR BLD AUTO: 6 %
NEUTROPHILS # BLD AUTO: 3 X10E3/UL (ref 1.4–7)
NEUTROPHILS NFR BLD AUTO: 48 %
PLATELET # BLD AUTO: 299 X10E3/UL (ref 150–450)
POTASSIUM SERPL-SCNC: 4.2 MMOL/L (ref 3.5–5.2)
RBC # BLD AUTO: 4.53 X10E6/UL (ref 3.77–5.28)
SL AMB EGFR AFRICAN AMERICAN: 103 ML/MIN/1.73
SL AMB EGFR NON AFRICAN AMERICAN: 89 ML/MIN/1.73
SL AMB VLDL CHOLESTEROL CALC: 11 MG/DL (ref 5–40)
SODIUM SERPL-SCNC: 142 MMOL/L (ref 134–144)
TRIGL SERPL-MCNC: 63 MG/DL (ref 0–149)
TSH SERPL DL<=0.005 MIU/L-ACNC: 1.23 UIU/ML (ref 0.45–4.5)
WBC # BLD AUTO: 6.2 X10E3/UL (ref 3.4–10.8)

## 2022-01-03 ENCOUNTER — TELEPHONE (OUTPATIENT)
Dept: ADMINISTRATIVE | Facility: OTHER | Age: 57
End: 2022-01-03

## 2022-01-03 DIAGNOSIS — F32.A DEPRESSION, UNSPECIFIED DEPRESSION TYPE: ICD-10-CM

## 2022-01-03 DIAGNOSIS — I10 ESSENTIAL HYPERTENSION, MALIGNANT: ICD-10-CM

## 2022-01-03 RX ORDER — NIFEDIPINE 30 MG/1
30 TABLET, FILM COATED, EXTENDED RELEASE ORAL DAILY
Qty: 90 TABLET | Refills: 3 | Status: SHIPPED | OUTPATIENT
Start: 2022-01-03

## 2022-01-03 NOTE — LETTER
Diabetic Eye Exam Form    Date Requested: 22  Patient: Wilbert Gardiner  Patient : 1965   Referring Provider: OVIDIO Mckeon    Dilated Retinal Exam, Optomap-Iris Exam, or Fundus Photography Done         Yes ([place] one above)         No     Date of Diabetic Eye Exam ______________________________  Left Eye      Exam did show retinopathy    Exam did not show retinopathy         Mild       Moderate       None       Proliferative       Severe     Right Eye     Exam did show retinopathy    Exam did not show retinopathy         Mild       Moderate       None       Proliferative       Severe     Comments __________________________________________________________    Practice Providing Exam ______________________________________________    Exam Performed By (print name) _______________________________________      Provider Signature ___________________________________________________      These reports are needed for  compliance  Please fax this completed form and a copy of the Diabetic Eye Exam report to our office located at [place] [address] as soon as possible via [phone] miguel Burnett: Phone [phone]    We thank you for your assistance in treating our mutual patient

## 2022-01-03 NOTE — TELEPHONE ENCOUNTER
Upon review of the In Basket request and the patient's chart, initial outreach has been made via fax, please see Contacts section for details       Thank you  Jude East MA

## 2022-01-03 NOTE — TELEPHONE ENCOUNTER
----- Message from Matt Del Toro sent at 12/30/2021 10:01 AM EST -----  12/30/21 10:01 AM    Hello, our patient Joann Lewis has had eye exam completed/performed  Please assist in updating the patient chart by Haleigh Ferrari in [address] [place] [address] sometime this year    Thank you,  Matt Del Toro  PG ROMELIA JOHN

## 2022-01-04 NOTE — TELEPHONE ENCOUNTER
Upon review of the In Basket request we were able to locate, review, and update the patient chart as requested for Diabetic Eye Exam     Any additional questions or concerns should be emailed to the Practice Liaisons via [email or web address] email, please do not reply via In Basket      Thank you  Carola Engel MA

## 2022-01-27 ENCOUNTER — TELEPHONE (OUTPATIENT)
Dept: FAMILY MEDICINE CLINIC | Facility: CLINIC | Age: 57
End: 2022-01-27

## 2022-02-16 ENCOUNTER — TELEPHONE (OUTPATIENT)
Dept: FAMILY MEDICINE CLINIC | Facility: CLINIC | Age: 57
End: 2022-02-16

## 2022-10-24 ENCOUNTER — OFFICE VISIT (OUTPATIENT)
Dept: FAMILY MEDICINE CLINIC | Facility: CLINIC | Age: 57
End: 2022-10-24

## 2022-10-24 VITALS
WEIGHT: 149 LBS | HEIGHT: 62 IN | SYSTOLIC BLOOD PRESSURE: 110 MMHG | DIASTOLIC BLOOD PRESSURE: 72 MMHG | TEMPERATURE: 98 F | RESPIRATION RATE: 14 BRPM | HEART RATE: 66 BPM | BODY MASS INDEX: 27.42 KG/M2

## 2022-10-24 DIAGNOSIS — R51.9 ACUTE NONINTRACTABLE HEADACHE, UNSPECIFIED HEADACHE TYPE: ICD-10-CM

## 2022-10-24 DIAGNOSIS — R50.9 FEVER, UNSPECIFIED FEVER CAUSE: Primary | ICD-10-CM

## 2022-10-24 DIAGNOSIS — Z00.00 ANNUAL PHYSICAL EXAM: ICD-10-CM

## 2022-10-24 DIAGNOSIS — Z12.31 ENCOUNTER FOR SCREENING MAMMOGRAM FOR BREAST CANCER: Primary | ICD-10-CM

## 2022-10-24 DIAGNOSIS — N39.0 URINARY TRACT INFECTION WITHOUT HEMATURIA, SITE UNSPECIFIED: ICD-10-CM

## 2022-10-24 PROBLEM — K59.00 CONSTIPATION: Status: RESOLVED | Noted: 2017-05-03 | Resolved: 2022-10-24

## 2022-10-24 LAB
SL AMB  POCT GLUCOSE, UA: ABNORMAL
SL AMB LEUKOCYTE ESTERASE,UA: 500
SL AMB POCT BILIRUBIN,UA: ABNORMAL
SL AMB POCT BLOOD,UA: 50
SL AMB POCT CLARITY,UA: ABNORMAL
SL AMB POCT COLOR,UA: YELLOW
SL AMB POCT KETONES,UA: ABNORMAL
SL AMB POCT NITRITE,UA: ABNORMAL
SL AMB POCT PH,UA: 7
SL AMB POCT SPECIFIC GRAVITY,UA: 1.01
SL AMB POCT URINE PROTEIN: ABNORMAL
SL AMB POCT UROBILINOGEN: ABNORMAL

## 2022-10-24 RX ORDER — CIPROFLOXACIN 250 MG/1
250 TABLET, FILM COATED ORAL EVERY 12 HOURS SCHEDULED
Qty: 14 TABLET | Refills: 0 | Status: SHIPPED | OUTPATIENT
Start: 2022-10-24 | End: 2022-10-31

## 2022-10-24 NOTE — PROGRESS NOTES
Chief Complaint   Patient presents with   • Fever        Patient ID: Jame Cain is a 62 y o  female  Fever  This is a new problem  The current episode started in the past 7 days  The problem occurs intermittently  The problem has been unchanged  Associated symptoms include fatigue and a fever  Pertinent negatives include no abdominal pain, anorexia, arthralgias, change in bowel habit, chest pain, congestion, coughing, headaches, joint swelling, myalgias, nausea, neck pain, rash, sore throat, urinary symptoms, visual change or weakness  Nothing aggravates the symptoms  She has tried acetaminophen for the symptoms  The treatment provided mild relief  does not have sensation below the waist -  Doing self cath       The following portions of the patient's history were reviewed and updated as appropriate: allergies, current medications, past family history, past medical history, past social history, past surgical history and problem list     Review of Systems   Constitutional: Positive for fatigue and fever  HENT: Negative for congestion and sore throat  Respiratory: Negative for cough  Cardiovascular: Negative for chest pain  Gastrointestinal: Negative for abdominal pain, anorexia, change in bowel habit and nausea  Musculoskeletal: Negative for arthralgias, joint swelling, myalgias and neck pain  Skin: Negative for rash  Neurological: Negative for weakness and headaches         Current Outpatient Medications   Medication Sig Dispense Refill   • b complex vitamins tablet Take 1 tablet by mouth every morning     • Biotin 1 MG CAPS Take 1 capsule by mouth daily     • BLACK COHOSH PO Take by mouth every morning     • [place]-Glucosamine-Vit D (OSTEO BI-FLEX ONE PER DAY PO) Take by mouth     • Calcium Citrate-Vitamin D (CALCIUM + D PO) Take by mouth every morning     • cholecalciferol (VITAMIN D3) 400 units tablet Take 400 Units by mouth daily     • EQL EVENING PRIMROSE OIL PO Take by mouth     • Lactobacillus (ACIDOPHILUS PO) Take by mouth every morning     • multivitamin (THERAGRAN) TABS Take 1 tablet by mouth every morning     • NIFEdipine ER (ADALAT CC) 30 MG 24 hr tablet Take 1 tablet (30 mg total) by mouth daily 90 tablet 3   • Omega-3 Fatty Acids (FISH OIL) 1,000 mg by Does not apply route     • sertraline (ZOLOFT) 50 mg tablet Take 1 tablet (50 mg total) by mouth every morning 90 tablet 3   • vitamin E, tocopherol, 400 units capsule Take 400 Units by mouth every morning     • silver sulfadiazine (SILVADENE,SSD) 1 % cream Apply topically daily (Patient not taking: No sig reported) 50 g 0     No current facility-administered medications for this visit  Objective:    /72 (BP Location: Left arm, Patient Position: Sitting, Cuff Size: Standard)   Pulse 66   Temp 98 °F (36 7 °C) (Temporal)   Resp 14   Ht 5' 2" (1 575 m)   Wt 67 6 kg (149 lb)   BMI 27 25 kg/m²        Physical Exam  Constitutional:       General: She is not in acute distress  Appearance: She is not ill-appearing  Cardiovascular:      Rate and Rhythm: Normal rate and regular rhythm  Pulmonary:      Effort: Pulmonary effort is normal  No respiratory distress  Breath sounds: No wheezing, rhonchi or rales  Abdominal:      Palpations: Abdomen is soft  Tenderness: There is no abdominal tenderness  There is no right CVA tenderness or left CVA tenderness  Neurological:      Mental Status: She is alert  Labs in chart were reviewed    Recent Results (from the past 672 hour(s))   Urine culture    Collection Time: 10/24/22 12:00 AM    Specimen: Urine, Clean Catch    URINE   Result Value Ref Range    Urine Culture Result Final report (A)    Result    Collection Time: 10/24/22 12:00 AM   Result Value Ref Range    Result 1 Escherichia coli (A)     SL AMB ANTIMICROBIAL SUSCEPTIBILITY Comment    POCT urine dip auto non-scope    Collection Time: 10/24/22  2:57 PM   Result Value Ref Range     COLOR,UA yellow CLARITY,UA cloudy     SPECIFIC GRAVITY,UA 1 015      PH,UA 7     LEUKOCYTE ESTERASE,     NITRITE,UA neg     GLUCOSE, UA norm     KETONES,UA neg     BILIRUBIN,UA neg     BLOOD,UA 50     POCT URINE PROTEIN neg     SL AMB POCT UROBILINOGEN norm        Assessment/Plan:         Diagnoses and all orders for this visit:    Fever, unspecified fever cause  -     POCT urine dip auto non-scope    Acute nonintractable headache, unspecified headache type    resolved  Urinary tract infection without hematuria, site unspecified  -     ciprofloxacin (CIPRO) 250 mg tablet;  Take 1 tablet (250 mg total) by mouth every 12 (twelve) hours for 7 days    Urine Cx sent       rto prn                   Carrie Sheets

## 2022-10-24 NOTE — LETTER
October 24, 2022     Patient: Tai Rosas  YOB: 1965  Date of Visit: 10/24/2022      To Whom it May Concern:    Tai Rosas is under my professional care  Micheal Keith was seen in my office on 10/24/2022  Micheal Keith may return to work on 10/28/22  If you have any questions or concerns, please don't hesitate to call           Sincerely,          Galdino Carpenter MD        CC: No Recipients

## 2022-10-28 LAB
BACTERIA UR CULT: ABNORMAL
Lab: ABNORMAL
SL AMB ANTIMICROBIAL SUSCEPTIBILITY: ABNORMAL

## 2022-11-04 ENCOUNTER — HOSPITAL ENCOUNTER (OUTPATIENT)
Dept: RADIOLOGY | Facility: HOSPITAL | Age: 57
Discharge: HOME | End: 2022-11-04

## 2022-11-04 DIAGNOSIS — N20.0 NEPHROLITHIASIS: ICD-10-CM

## 2022-11-23 ENCOUNTER — OFFICE VISIT (OUTPATIENT)
Dept: UROLOGY | Facility: CLINIC | Age: 57
End: 2022-11-23

## 2022-11-23 VITALS
HEART RATE: 62 BPM | HEIGHT: 62 IN | BODY MASS INDEX: 26.83 KG/M2 | DIASTOLIC BLOOD PRESSURE: 80 MMHG | WEIGHT: 145.8 LBS | OXYGEN SATURATION: 97 % | SYSTOLIC BLOOD PRESSURE: 130 MMHG

## 2022-11-23 DIAGNOSIS — N20.0 NEPHROLITHIASIS: Primary | ICD-10-CM

## 2022-11-23 DIAGNOSIS — N31.9 NEUROGENIC BLADDER: ICD-10-CM

## 2022-11-23 NOTE — PROGRESS NOTES
2022      Chief Complaint   Patient presents with   • Follow-up     Assessment and Plan    62 y o  female     1  Neurogenic bladder  · Increase to CIC 4x daily with 16 F, straight tipped catheter  · F/u 1 year US and BMP prior to visit    2  Nephrolithiasis  · Behavior in dietary modifications discussed to decrease future stone formation    History of Present Illness  Britni Velazquez is a 62 y o  female here for follow up evaluation of  neurogenic bladder and nephrolithiasis  Patient has neurogenic bladder and performing CIC  3 times daily  She now is noticing decreasing volitional voiding and the occasional incontinence  1 urinary infection recently  Denies any difficulty with self catheterization  Denies any dysuria, gross hematuria, flank pain  Recent ultrasound (22) negative for upper tract obstruction  Possible small nonobstructing stone left intrarenal stone  Simple right renal cystcyst     Review of Systems   Constitutional: Negative for chills and fever  Respiratory: Negative for cough and shortness of breath  Cardiovascular: Negative for chest pain  Gastrointestinal: Negative for abdominal distention, abdominal pain, blood in stool, nausea and vomiting  Genitourinary: Negative for difficulty urinating, dysuria, enuresis, flank pain, frequency, hematuria and urgency  Musculoskeletal: Negative for back pain  Skin: Negative for rash  Neurological: Negative for dizziness       Past Medical History  Past Medical History:   Diagnosis Date   • Benign essential hypertension 10/09/2009   • Contact lens/glasses fitting    • Disorder of arteries and arterioles ([address] ) 2009   • Hypertension    • PONV (postoperative nausea and vomiting)    • Tethered spinal cord (HCC)     from the left knee 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)       Past Social History  Past Surgical History:   Procedure Laterality Date   •  SECTION      x3; , ,    • COLONOSCOPY N/A 7/10/2017    Procedure: COLONOSCOPY;  Surgeon: Connor Zarate MD;  Location: [facility]; Service: Gastroenterology   • WISDOM TOOTH EXTRACTION       Social History     Tobacco Use   Smoking Status Former   • Packs/day: 0 50   • Years: 4 00   • Pack years: 2 00   • Types: Cigarettes   • Quit date: 5   • Years since quittin 9   Smokeless Tobacco Never       Past Family History  Family History   Problem Relation Age of Onset   • No Known Problems Mother    • Cancer Father         liver   • Heart disease Brother         hypotropic cardiomyopathy   • Cardiomyopathy Brother    • Sudden death Brother         cardiac (SCD)   • Other Brother         idiopathic hypertrophic subarotic stenosis   • Breast cancer Sister    • Cancer Maternal Grandmother        Past Social history  Social History     Socioeconomic History   • Marital status: /Civil Union     Spouse name: Not on file   • Number of children: Not on file   • Years of education: Not on file   • Highest education level: Not on file   Occupational History   • Not on file   Tobacco Use   • Smoking status: Former     Packs/day: 0 50     Years: 4 00     Pack years: 2 00     Types: Cigarettes     Quit date:      Years since quittin 9   • Smokeless tobacco: Never   Substance and Sexual Activity   • Alcohol use:  Yes   • Drug use: No   • Sexual activity: Not Currently     Partners: Male   Other Topics Concern   • Not on file   Social History Narrative    Daily coffee consumption (2 cups/day)    Exercise: walking    Pets/animals: Cat    Pets/animals: Dog    Sleeps 8-10 hours a day     Social Determinants of Health     Financial Resource Strain: Not on file   Food Insecurity: Not on file   Transportation Needs: Not on file   Physical Activity: Not on file   Stress: Not on file   Social Connections: Not on file   Intimate Partner Violence: Not on file   Housing Stability: Not on file       Current Medications  Current Outpatient Medications Medication Sig Dispense Refill   • b complex vitamins tablet Take 1 tablet by mouth every morning     • Biotin 1 MG CAPS Take 1 capsule by mouth daily     • BLACK COHOSH PO Take by mouth every morning     • [place]-Glucosamine-Vit D (OSTEO BI-FLEX ONE PER DAY PO) Take by mouth     • Calcium Citrate-Vitamin D (CALCIUM + D PO) Take by mouth every morning     • cholecalciferol (VITAMIN D3) 400 units tablet Take 400 Units by mouth daily     • EQL EVENING PRIMROSE OIL PO Take by mouth     • Lactobacillus (ACIDOPHILUS PO) Take by mouth every morning     • multivitamin (THERAGRAN) TABS Take 1 tablet by mouth every morning     • NIFEdipine ER (ADALAT CC) 30 MG 24 hr tablet Take 1 tablet (30 mg total) by mouth daily 90 tablet 3   • Omega-3 Fatty Acids (FISH OIL) 1,000 mg by Does not apply route     • sertraline (ZOLOFT) 50 mg tablet Take 1 tablet (50 mg total) by mouth every morning 90 tablet 3   • vitamin E, tocopherol, 400 units capsule Take 400 Units by mouth every morning       No current facility-administered medications for this visit  Allergies  No Known Allergies      The following portions of the patient's history were reviewed and updated as appropriate: allergies, current medications, past medical history, past social history, past surgical history and problem list       Vitals  Vitals:    11/23/22 1107   BP: 130/80   BP Location: Left arm   Patient Position: Sitting   Cuff Size: Standard   Pulse: 62   SpO2: 97%   Weight: 66 1 kg (145 lb 12 8 oz)   Height: 5' 2" (1 575 m)     Physical Exam  Physical Exam  Vitals reviewed  Constitutional:       Appearance: She is well-developed  HENT:      Head: Atraumatic  Cardiovascular:      Rate and Rhythm: Normal rate  Pulmonary:      Effort: Pulmonary effort is normal       Breath sounds: Normal breath sounds  Abdominal:      Palpations: Abdomen is soft  Musculoskeletal:         General: Normal range of motion  Skin:     General: Skin is warm and dry  Neurological:      Mental Status: She is alert and oriented to person, place, and time  Results  No results found for this or any previous visit (from the past 1 hour(s))  ]  No results found for: PSA  Lab Results   Component Value Date    GLUCOSE 86 12/20/2017    CALCIUM 9 1 12/20/2017     12/20/2017    K 4 2 12/30/2021    CO2 25 12/30/2021     12/30/2021    BUN 20 12/30/2021    CREATININE 0 75 12/30/2021     Lab Results   Component Value Date    WBC 6 2 12/30/2021    HGB 13 3 12/30/2021    HCT 38 4 12/30/2021    MCV 85 12/30/2021     12/30/2021     Orders  Orders Placed This Encounter   Procedures   • US kidney and bladder     Standing Status:   Future     Standing Expiration Date:   11/23/2023     Scheduling Instructions:      "Prep required if being scheduled in conjunction with other studies, refer to those examination's Preps first before scheduling  All patients for US Kidney and Bladder they must drink 24 oz of water 60 minutes before your scheduled appointment time  This test requires you to have a FULL bladder  Please do not urinate before your test             Please bring your physician order, insurance cards, a form of photo ID and a list of your medications with you  Arrive 15 minutes prior to your appointment time in order to register  If you need to have lab work or a urinalysis, please do this AFTER your ultrasound  "     Order Specific Question:   Reason for Exam:     Answer:   neurogenic bladder     Order Specific Question:   Is the patient pregnant? Answer:   Unknown   • Basic metabolic panel     This is a patient instruction: Patient fasting for 8 hours or longer recommended       Standing Status:   Future     Number of Occurrences:   1     Standing Expiration Date:   11/23/2023       OVIDIO Vines

## 2022-12-01 LAB
ALBUMIN SERPL-MCNC: 4.4 G/DL (ref 3.8–4.9)
ALBUMIN/GLOB SERPL: 2.1 {RATIO} (ref 1.2–2.2)
ALP SERPL-CCNC: 66 IU/L (ref 44–121)
ALT SERPL-CCNC: 18 IU/L (ref 0–32)
AST SERPL-CCNC: 22 IU/L (ref 0–40)
BILIRUB SERPL-MCNC: 0.3 MG/DL (ref 0–1.2)
BUN SERPL-MCNC: 20 MG/DL (ref 6–24)
BUN/CREAT SERPL: 22 (ref 9–23)
CALCIUM SERPL-MCNC: 9.6 MG/DL (ref 8.7–10.2)
CHLORIDE SERPL-SCNC: 104 MMOL/L (ref 96–106)
CHOLEST SERPL-MCNC: 199 MG/DL (ref 100–199)
CHOLEST/HDLC SERPL: 3 RATIO (ref 0–4.4)
CO2 SERPL-SCNC: 26 MMOL/L (ref 20–29)
CREAT SERPL-MCNC: 0.9 MG/DL (ref 0.57–1)
EGFR: 75 ML/MIN/1.73
ERYTHROCYTE [DISTWIDTH] IN BLOOD BY AUTOMATED COUNT: 12 % (ref 11.7–15.4)
EST. AVERAGE GLUCOSE BLD GHB EST-MCNC: 108 MG/DL
GLOBULIN SER-MCNC: 2.1 G/DL (ref 1.5–4.5)
GLUCOSE SERPL-MCNC: 89 MG/DL (ref 70–99)
HBA1C MFR BLD: 5.4 % (ref 4.8–5.6)
HCT VFR BLD AUTO: 37 % (ref 34–46.6)
HDLC SERPL-MCNC: 66 MG/DL
HGB BLD-MCNC: 12.3 G/DL (ref 11.1–15.9)
LDLC SERPL CALC-MCNC: 121 MG/DL (ref 0–99)
MCH RBC QN AUTO: 28 PG (ref 26.6–33)
MCHC RBC AUTO-ENTMCNC: 33.2 G/DL (ref 31.5–35.7)
MCV RBC AUTO: 84 FL (ref 79–97)
MICRODELETION SYND BLD/T FISH: NORMAL
PLATELET # BLD AUTO: 275 X10E3/UL (ref 150–450)
POTASSIUM SERPL-SCNC: 4.6 MMOL/L (ref 3.5–5.2)
PROT SERPL-MCNC: 6.5 G/DL (ref 6–8.5)
RBC # BLD AUTO: 4.4 X10E6/UL (ref 3.77–5.28)
SL AMB VLDL CHOLESTEROL CALC: 12 MG/DL (ref 5–40)
SODIUM SERPL-SCNC: 141 MMOL/L (ref 134–144)
TRIGL SERPL-MCNC: 63 MG/DL (ref 0–149)
TSH SERPL DL<=0.005 MIU/L-ACNC: 1.43 UIU/ML (ref 0.45–4.5)
WBC # BLD AUTO: 6.8 X10E3/UL (ref 3.4–10.8)

## 2022-12-05 ENCOUNTER — OFFICE VISIT (OUTPATIENT)
Dept: FAMILY MEDICINE CLINIC | Facility: CLINIC | Age: 57
End: 2022-12-05

## 2022-12-05 VITALS
BODY MASS INDEX: 26.87 KG/M2 | SYSTOLIC BLOOD PRESSURE: 110 MMHG | DIASTOLIC BLOOD PRESSURE: 78 MMHG | RESPIRATION RATE: 18 BRPM | TEMPERATURE: 97.9 F | HEART RATE: 84 BPM | WEIGHT: 146 LBS | HEIGHT: 62 IN

## 2022-12-05 DIAGNOSIS — Z23 NEED FOR INFLUENZA VACCINATION: ICD-10-CM

## 2022-12-05 DIAGNOSIS — Z12.11 COLON CANCER SCREENING: ICD-10-CM

## 2022-12-05 DIAGNOSIS — Z00.00 ANNUAL PHYSICAL EXAM: Primary | ICD-10-CM

## 2022-12-05 PROBLEM — Z78.9 SELF-CATHETERIZES URINARY BLADDER: Status: ACTIVE | Noted: 2022-12-05

## 2022-12-05 PROBLEM — R33.9 URINARY RETENTION: Status: ACTIVE | Noted: 2022-12-05

## 2022-12-05 NOTE — PROGRESS NOTES
Chief Complaint   Patient presents with   • Annual Exam        Patient ID: Chyrl Tom is a 62 y o  female  HPI  Pt is seeing for annual PE    The following portions of the patient's history were reviewed and updated as appropriate: allergies, current medications, past family history, past medical history, past social history, past surgical history and problem list     Review of Systems   Constitutional: Negative for fatigue, fever and unexpected weight change  HENT: Negative for congestion, ear discharge, ear pain, hearing loss, rhinorrhea, sinus pressure, sore throat and trouble swallowing  Eyes: Negative  Respiratory: Negative  Cardiovascular: Negative  Gastrointestinal: Negative  Endocrine: Negative  Genitourinary: Negative for pelvic pain, vaginal bleeding, vaginal discharge and vaginal pain  Self cath for urinary retention -  F/u with urologist    Musculoskeletal: Negative  Skin: Negative  Neurological: Negative for dizziness, weakness, light-headedness and numbness  Hematological: Negative  Psychiatric/Behavioral: Negative          Current Outpatient Medications   Medication Sig Dispense Refill   • b complex vitamins tablet Take 1 tablet by mouth every morning     • Biotin 1 MG CAPS Take 1 capsule by mouth daily     • BLACK COHOSH PO Take by mouth every morning     • [place]-Glucosamine-Vit D (OSTEO BI-FLEX ONE PER DAY PO) Take by mouth     • Calcium Citrate-Vitamin D (CALCIUM + D PO) Take by mouth every morning     • cholecalciferol (VITAMIN D3) 400 units tablet Take 400 Units by mouth daily     • EQL EVENING PRIMROSE OIL PO Take by mouth     • Lactobacillus (ACIDOPHILUS PO) Take by mouth every morning     • multivitamin (THERAGRAN) TABS Take 1 tablet by mouth every morning     • NIFEdipine ER (ADALAT CC) 30 MG 24 hr tablet Take 1 tablet (30 mg total) by mouth daily 90 tablet 3   • Omega-3 Fatty Acids (FISH OIL) 1,000 mg by Does not apply route     • sertraline (ZOLOFT) 50 mg tablet Take 1 tablet (50 mg total) by mouth every morning 90 tablet 3   • vitamin E, tocopherol, 400 units capsule Take 400 Units by mouth every morning       No current facility-administered medications for this visit  Objective:    /78 (BP Location: Left arm, Patient Position: Sitting, Cuff Size: Standard)   Pulse 84   Temp 97 9 °F (36 6 °C)   Resp 18   Ht 5' 2" (1 575 m)   Wt 66 2 kg (146 lb)   BMI 26 70 kg/m²        Physical Exam  Constitutional:       General: She is not in acute distress  Appearance: She is well-developed and well-nourished  She is not ill-appearing  HENT:      Head: Normocephalic and atraumatic  Right Ear: Hearing, tympanic membrane, ear canal and external ear normal       Left Ear: Hearing, tympanic membrane, ear canal and external ear normal       Nose: No congestion or rhinorrhea  Mouth/Throat:      Mouth: Oropharynx is clear and moist       Pharynx: No oropharyngeal exudate or posterior oropharyngeal erythema  Eyes:      Extraocular Movements: Extraocular movements intact  Conjunctiva/sclera: Conjunctivae normal    Neck:      Thyroid: No thyroid mass or thyromegaly  Vascular: No JVD  Cardiovascular:      Rate and Rhythm: Normal rate and regular rhythm  Heart sounds: Normal heart sounds  No murmur heard  No gallop  Pulmonary:      Effort: No respiratory distress  Breath sounds: Normal breath sounds  No wheezing, rhonchi or rales  Abdominal:      General: Bowel sounds are normal       Palpations: Abdomen is soft  There is no mass  Tenderness: There is no abdominal tenderness  There is no right CVA tenderness or left CVA tenderness  Musculoskeletal:         General: No swelling or tenderness  Cervical back: Normal range of motion and neck supple  Right lower leg: No edema  Left lower leg: No edema  Lymphadenopathy:      Cervical: No cervical adenopathy  Skin:     General: Skin is intact  Coloration: Skin is not pale  Findings: No rash  Neurological:      General: No focal deficit present  Mental Status: She is alert and oriented to person, place, and time  Cranial Nerves: No cranial nerve deficit  Motor: Motor strength is normal    Psychiatric:         Mood and Affect: Mood and affect and mood normal          Behavior: Behavior normal          Thought Content: Thought content normal          Judgment: Judgment normal            Labs in chart were reviewed    Recent Results (from the past 672 hour(s))   CBC    Collection Time: 11/30/22  9:37 AM   Result Value Ref Range    White Blood Cell Count 6 8 3 4 - 10 8 x10E3/uL    Red Blood Cell Count 4 40 3 77 - 5 28 x10E6/uL    Hemoglobin 12 3 11 1 - 15 9 g/dL    HCT 37 0 34 0 - 46 6 %    MCV 84 79 - 97 fL    MCH 28 0 26 6 - 33 0 pg    MCHC 33 2 31 5 - 35 7 g/dL    RDW 12 0 11 7 - 15 4 %    Platelet Count 317 400 - 450 x10E3/uL   Comprehensive metabolic panel    Collection Time: 11/30/22  9:37 AM   Result Value Ref Range    Glucose, Random 89 70 - 99 mg/dL    BUN 20 6 - 24 mg/dL    Creatinine 0 90 0 57 - 1 00 mg/dL    eGFR 75 >59 mL/min/1 73    SL AMB BUN/CREATININE RATIO 22 9 - 23    Sodium 141 134 - 144 mmol/L    Potassium 4 6 3 5 - 5 2 mmol/L    Chloride 104 96 - 106 mmol/L    CO2 26 20 - 29 mmol/L    CALCIUM 9 6 8 7 - 10 2 mg/dL    Protein, Total 6 5 6 0 - 8 5 g/dL    Albumin 4 4 3 8 - 4 9 g/dL    Globulin, Total 2 1 1 5 - 4 5 g/dL    Albumin/Globulin Ratio 2 1 1 2 - 2 2    TOTAL BILIRUBIN 0 3 0 0 - 1 2 mg/dL    Alk Phos Isoenzymes 66 44 - 121 IU/L    AST 22 0 - 40 IU/L    ALT 18 0 - 32 IU/L   Hemoglobin A1C    Collection Time: 11/30/22  9:37 AM   Result Value Ref Range    Hemoglobin A1C 5 4 4 8 - 5 6 %    Estimated Average Glucose 108 mg/dL   TSH, 3rd generation    Collection Time: 11/30/22  9:37 AM   Result Value Ref Range    TSH 1 430 0 450 - 4 500 uIU/mL   Lipid panel    Collection Time: 11/30/22  9:37 AM   Result Value Ref Range    Cholesterol, Total 199 100 - 199 mg/dL    Triglycerides 63 0 - 149 mg/dL    HDL 66 >39 mg/dL    VLDL Cholesterol Calculated 12 5 - 40 mg/dL    LDL Calculated 121 (H) 0 - 99 mg/dL    T  Chol/HDL Ratio 3 0 0 0 - 4 4 ratio   Cardiovascular Report    Collection Time: 11/30/22  9:37 AM   Result Value Ref Range    Interpretation Note        Assessment/Plan:         Diagnoses and all orders for this visit:    Annual physical exam    Colon cancer screening  -     Ambulatory referral for colonoscopy; Future    Need for influenza vaccination  -     influenza vaccine, quadrivalent, recombinant, PF, 0 5 mL, for patients 18 yr+ (FLUBLOK)    pt was advised to call GI to clarify the next date for colonoscopy-   Was done in 2017 and normal as per pt       BMI Counseling: Body mass index is 26 7 kg/m²  Discussed the patient's BMI with her  The BMI is above normal  Exercise recommendations include exercising 3-5 times per week         rto in 1  y           Reynaldo Diamond MD

## 2022-12-06 DIAGNOSIS — Z12.31 ENCOUNTER FOR SCREENING MAMMOGRAM FOR BREAST CANCER: ICD-10-CM

## 2022-12-07 DIAGNOSIS — I10 ESSENTIAL HYPERTENSION, MALIGNANT: ICD-10-CM

## 2022-12-07 DIAGNOSIS — F32.A DEPRESSION, UNSPECIFIED DEPRESSION TYPE: ICD-10-CM

## 2022-12-07 RX ORDER — NIFEDIPINE 30 MG/1
TABLET, EXTENDED RELEASE ORAL
Qty: 90 TABLET | Refills: 1 | Status: SHIPPED | OUTPATIENT
Start: 2022-12-07

## 2022-12-21 ENCOUNTER — TELEPHONE (OUTPATIENT)
Dept: FAMILY MEDICINE CLINIC | Facility: CLINIC | Age: 57
End: 2022-12-21

## 2022-12-21 NOTE — TELEPHONE ENCOUNTER
lmtrc , we need to know if patient went for additional testing from her mammo    If so please call for records , if not please let Dr Vila Myra know tc/cm

## 2022-12-23 NOTE — TELEPHONE ENCOUNTER
[facility] called back and [facility]  Called [facility]  and spoke with Elaine   She will fax reports- patient had ultrasound and mammogram

## 2023-01-04 ENCOUNTER — TELEPHONE (OUTPATIENT)
Dept: UROLOGY | Facility: MEDICAL CENTER | Age: 58
End: 2023-01-04

## 2023-01-04 NOTE — TELEPHONE ENCOUNTER
Left message for patient informing her to call back and let us know if she has a medical supply company already chosen, or if not we can send an order to [facility] which we normally use and have a good relationship  Left office number for patient to call back

## 2023-01-04 NOTE — TELEPHONE ENCOUNTER
Patient seen by Brittney Serna at [facility]    Patient called stating she needs a copy of prescription for her catheter supplies  She is switching medical supply company and she needs to send them the script        Patient can be reached at [phone]

## 2023-01-05 NOTE — TELEPHONE ENCOUNTER
[facility] [facility] is new supplier for pt cath supplies     [phone]     Customer [phone]

## 2023-01-09 NOTE — TELEPHONE ENCOUNTER
Spoke with [facility] and the rep states patient needs to call them with the item number of the catheters she is currently using

## 2023-01-09 NOTE — TELEPHONE ENCOUNTER
Left message for patient informing her I spoke with rep @ [employer] [facility] who suggested patient call them with the item number of the supplies she currently uses    The item number found on her previous forms was not correct per [facility], and they would like patient to f/u with them

## 2023-06-07 DIAGNOSIS — I10 ESSENTIAL HYPERTENSION, MALIGNANT: ICD-10-CM

## 2023-06-07 RX ORDER — NIFEDIPINE 30 MG/1
TABLET, EXTENDED RELEASE ORAL
Qty: 90 TABLET | Refills: 1 | Status: SHIPPED | OUTPATIENT
Start: 2023-06-07

## 2023-07-05 DIAGNOSIS — F32.A DEPRESSION, UNSPECIFIED DEPRESSION TYPE: ICD-10-CM

## 2023-07-08 DIAGNOSIS — I10 ESSENTIAL HYPERTENSION, MALIGNANT: ICD-10-CM

## 2023-07-08 RX ORDER — NIFEDIPINE 30 MG/1
30 TABLET, EXTENDED RELEASE ORAL DAILY
Qty: 90 TABLET | Refills: 1 | Status: SHIPPED | OUTPATIENT
Start: 2023-07-08

## (undated) DEVICE — DISPOSABLE BIOPSY VALVE MAJ-1555: Brand: SINGLE USE BIOPSY VALVE (STERILE)

## (undated) DEVICE — MARKER SPOT EX  BOWEL TATTOO SYRINGE

## (undated) DEVICE — SOLIDIFIER FLUID WASTE CONTROL 1500ML

## (undated) DEVICE — BRUSH CYTOLOGY 3 MM 240 CM

## (undated) DEVICE — TRAP POLY

## (undated) DEVICE — TUBING BUBBLE CLEAR 5MM X 100 FT NS

## (undated) DEVICE — TUBING AUX CHANNEL

## (undated) DEVICE — SNARE BARBED 230CM

## (undated) DEVICE — GAUZE SPONGES,16 PLY: Brand: CURITY

## (undated) DEVICE — GROUNDING PAD UNIVERSAL SLW

## (undated) DEVICE — MEDI-VAC YANKAUER SUCTION HANDLE: Brand: CARDINAL HEALTH

## (undated) DEVICE — Device: Brand: OLYMPUS

## (undated) DEVICE — FORCEP ELECSURG RADIAL JAW4 2.2 X 240CM  HOT BX

## (undated) DEVICE — SINGLE-USE BIOPSY FORCEPS: Brand: RADIAL JAW 4

## (undated) DEVICE — BRUSH ENDO CLEANING DBL-HEADER

## (undated) DEVICE — 1200CC GUARDIAN II: Brand: GUARDIAN

## (undated) DEVICE — BAG SPECIMEN BIOHAZARD 10 X 10 ADHESIVE

## (undated) DEVICE — GLOVE EXAM NON-STRL NTRL PLUS LRG PF

## (undated) DEVICE — 60 ML SYRINGE,REGULAR TIP: Brand: MONOJECT

## (undated) DEVICE — TRAVELKIT CONTAINS FIRST STEP KIT (200ML EP-4 KIT) AND SOILED SCOPE BAG - 1 KIT: Brand: TRAVELKIT CONTAINS FIRST STEP KIT AND SOILED SCOPE BAG

## (undated) DEVICE — AIRLIFE™  ADULT CUSHION NASAL CANNULA WITH 7 FOOT (2.1 M) CRUSH-RESISTANT OXYGEN TUBING, AND U/CONNECT-IT ADAPTER: Brand: AIRLIFE™

## (undated) DEVICE — LUBRICANT SURGILUBE TUBE 4 OZ  FLIP TOP